# Patient Record
Sex: MALE | Race: WHITE
[De-identification: names, ages, dates, MRNs, and addresses within clinical notes are randomized per-mention and may not be internally consistent; named-entity substitution may affect disease eponyms.]

---

## 2017-02-18 ENCOUNTER — HOSPITAL ENCOUNTER (EMERGENCY)
Dept: HOSPITAL 53 - M ED | Age: 62
LOS: 1 days | Discharge: HOME | End: 2017-02-19
Payer: COMMERCIAL

## 2017-02-18 DIAGNOSIS — Z88.0: ICD-10-CM

## 2017-02-18 DIAGNOSIS — M54.9: ICD-10-CM

## 2017-02-18 DIAGNOSIS — M10.9: ICD-10-CM

## 2017-02-18 DIAGNOSIS — F17.220: ICD-10-CM

## 2017-02-18 DIAGNOSIS — R22.32: ICD-10-CM

## 2017-02-18 DIAGNOSIS — R07.89: Primary | ICD-10-CM

## 2017-02-18 DIAGNOSIS — M19.90: ICD-10-CM

## 2017-02-18 LAB
BASOPHILS # BLD AUTO: 0.1 K/MM3 (ref 0–0.2)
BASOPHILS NFR BLD AUTO: 0.7 % (ref 0–1)
EOSINOPHIL # BLD AUTO: 0.6 K/MM3 (ref 0–0.5)
EOSINOPHIL NFR BLD AUTO: 4.5 % (ref 0–3)
ERYTHROCYTE [DISTWIDTH] IN BLOOD BY AUTOMATED COUNT: 12.8 % (ref 11.5–14.5)
LARGE UNSTAINED CELL #: 0.2 K/MM3 (ref 0–0.4)
LARGE UNSTAINED CELL %: 1.4 % (ref 0–4)
LYMPHOCYTES # BLD AUTO: 1.8 K/MM3 (ref 1.5–4.5)
LYMPHOCYTES NFR BLD AUTO: 13.1 % (ref 24–44)
MCH RBC QN AUTO: 31.1 PG (ref 27–33)
MCHC RBC AUTO-ENTMCNC: 34.8 G/DL (ref 32–36.5)
MCV RBC AUTO: 89.4 FL (ref 80–96)
MONOCYTES # BLD AUTO: 0.9 K/MM3 (ref 0–0.8)
MONOCYTES NFR BLD AUTO: 6.9 % (ref 0–5)
NEUTROPHILS # BLD AUTO: 9.1 K/MM3 (ref 1.8–7.7)
NEUTROPHILS NFR BLD AUTO: 73.3 % (ref 36–66)
PLATELET # BLD AUTO: 304 K/MM3 (ref 150–450)
WBC # BLD AUTO: 12.4 K/MM3 (ref 4–10)

## 2017-02-18 PROCEDURE — 84484 ASSAY OF TROPONIN QUANT: CPT

## 2017-02-18 PROCEDURE — 96374 THER/PROPH/DIAG INJ IV PUSH: CPT

## 2017-02-18 PROCEDURE — 80048 BASIC METABOLIC PNL TOTAL CA: CPT

## 2017-02-18 PROCEDURE — 85025 COMPLETE CBC W/AUTO DIFF WBC: CPT

## 2017-02-18 PROCEDURE — 36415 COLL VENOUS BLD VENIPUNCTURE: CPT

## 2017-02-18 PROCEDURE — 82553 CREATINE MB FRACTION: CPT

## 2017-02-18 PROCEDURE — 99285 EMERGENCY DEPT VISIT HI MDM: CPT

## 2017-02-18 PROCEDURE — 71020: CPT

## 2017-02-18 PROCEDURE — 93005 ELECTROCARDIOGRAM TRACING: CPT

## 2017-02-18 PROCEDURE — 82550 ASSAY OF CK (CPK): CPT

## 2017-02-18 PROCEDURE — 93041 RHYTHM ECG TRACING: CPT

## 2017-02-18 PROCEDURE — 73130 X-RAY EXAM OF HAND: CPT

## 2017-02-19 LAB
ANION GAP SERPL CALC-SCNC: 8 MEQ/L (ref 8–16)
BUN SERPL-MCNC: 12 MG/DL (ref 7–18)
CALCIUM SERPL-MCNC: 8.4 MG/DL (ref 8.8–10.2)
CHLORIDE SERPL-SCNC: 104 MEQ/L (ref 98–107)
CO2 SERPL-SCNC: 27 MEQ/L (ref 21–32)
CREAT SERPL-MCNC: 1.05 MG/DL (ref 0.7–1.3)
GFR SERPL CREATININE-BSD FRML MDRD: > 60 ML/MIN/{1.73_M2} (ref 49–?)
GLUCOSE SERPL-MCNC: 112 MG/DL (ref 80–110)
POTASSIUM SERPL-SCNC: 3.9 MEQ/L (ref 3.5–5.1)
SODIUM SERPL-SCNC: 139 MEQ/L (ref 136–145)

## 2017-02-19 NOTE — EDDOCDS
Physician Documentation                                                                           

[facility]                                                                         

Name: Laci Healy                                                                              

Age: 61 yrs                                                                                       

Sex: Male                                                                                         

: 1955                                                                                   

MRN: [id number]                                                                                     

Arrival Date: 2017                                                                          

Time: 22:32                                                                                       

Account#: [id number]                                                                              

Bed 12                                                                                            

Private MD: NO PRIMARY PHYSICIAN, .                                                               

Disposition:                                                                                      

17 03:17 Discharged to Home/Self Care. Impression: Gout, Chest pain, unspecified.           

- Condition is Stable.                                                                            

- Discharge Instructions: Gout, Gout, Easy-to-Read.                                               

- Prescriptions for Colchicine 0.6 mg Oral Tablet - take 1 tablet by ORAL route as                

directed take 2 tabs at gout onset, then 1 tab q1h prn x 2 doses maximum; 30 tablet.            

indomethacin 50 mg Oral Capsule - take 1 capsule by ORAL route 3 times per day with             

food; 30 capsule.                                                                               

- Medication Reconciliation, Local Pharmacy Hours form.                                           

- Follow up: Randy Steiner MD; When: Call to arrange an appointment; Reason:                     

Continuance of care.                                                                            

- Problem is an acute exacerbation.                                                               

- Symptoms have improved.                                                                         

                                                                                                  

                                                                                                  

                                                                                                  

Historical:                                                                                       

- Allergies: PENICILLINS (vomit blood );                                                          

- Home Meds:                                                                                      

1. pt does not know what he takes                                                               

- PMHx: Arthritis; back pain;                                                                     

- PSHx: none;                                                                                     

- Social history: Smoking status: Chewing Tobacco No barriers to communication noted,             

The patient speaks fluent English, Speaks appropriately for age.                                

- Family history: Not pertinent.                                                                  

- : The pt / caregiver states he / she is not on anticoagulants. Home medication list             

is obtained from the patient.                                                                   

- Exposure Risk Screening:: None identified.                                                      

                                                                                                  

                                                                                                  

Vital Signs:                                                                                      

                                                                                             

22:36  / 88; Pulse 83; Resp 18 S; Temp 96.3(O); Pulse Ox 100% on R/A; Weight 99.79 kg / gr2 

      220 lbs (R); Height 5 ft. 9 in. (175.26 cm) (R); Pain 10/10;                                

22:54  / 94 (auto/);                                                                    mlc 

22:58 Pulse 82 MON; Pulse Ox 96% ;                                                            mlc 

23:36  / 82 (auto/);                                                                    mlc 

23:36 Pulse 74 MON; Pulse Ox 92% ;                                                            mlc 

                                                                                             

00:06 Pulse 72 MON; Pulse Ox 94% ;                                                            mlc 

00:06  / 80 (auto/);                                                                    mlc 

00:36 Pulse 74 MON; Pulse Ox 94% ;                                                            mlc 

00:36  / 78 (auto/);                                                                    mlc 

01:06 Pulse 70 MON; Pulse Ox 94% ;                                                            mlc 

01:06  / 79 (auto/);                                                                    mlc 

01:18 Pulse 70 MON; Pulse Ox 94% ;                                                            mlc 

01:36  / 79 (auto/);                                                                    mlc 

01:36 Pulse 76 MON; Pulse Ox 94% ;                                                            mlc 

02:06  / 76 (auto/);                                                                    mlc 

02:06 Pulse 70 MON; Pulse Ox 94% ;                                                            mlc 

02:36  / 76 (auto/);                                                                    mlc 

02:36 Pulse 76 MON; Pulse Ox 94% ;                                                            mlc 

03:05 Pulse 70 MON; Pulse Ox 94% ;                                                            mlc 

03:06  / 76 (auto/);                                                                    mlc 

03:36  / 81 (auto/);                                                                    mlc 

03:36 Pulse 76 MON; Pulse Ox 96% ;                                                            mlc 

03:41 Pulse 74 MON; Pulse Ox 95% ;                                                            mlc 

03:41  / 74 (auto/);                                                                    mlc 

03:43  / 74; Pulse 73; Resp 18; Temp 98.3(O); Pulse Ox 96% on R/A; Pain 10/10;          amelia 

04:05 Pulse 64 MON; Pulse Ox 95% ;                                                            mlc 

04:25  / 75; Pulse 68; Resp 16; Temp 98.4; Pulse Ox 95% ; Pain 7/10;                    mlc 

                                                                                             

22:36 Body Mass Index 32.49 (99.79 kg, 175.26 cm)                                             gr2 

                                                                                                  

MDM:                                                                                              

                                                                                             

23:17 Cardiac Monitor/Pulse Ox/q 30 min VS ordered.                                           mm11

23:17 IV Saline Lock ordered.                                                                 mm11

23:17 Rhythm Strip to chart ordered.                                                          mm11

23:17 Undress patient appropriately for examination ordered.                                  mm11

23:17 Chest, 2 View (pa\E\lat) Ordered.                                                         EDMS

23:17 Basic Metabolic Profile Ordered.                                                        EDMS

23:17 CBC with Diff Ordered.                                                                  EDMS

23:17 Cardiac Injury Profile Ordered.                                                         EDMS

23:17 Troponin Ordered.                                                                       EDMS

23:18 ECG WITH READING ER PHYS+CARDIAG ordered.                                               EDMS

23:19 Hand, Complete Ordered.                                                                 EDMS

                                                                                             

00:00 Financial registration complete.                                                        pm4 

00:29 Basic Metabolic Profile Reviewed.                                                       mm11

00:29 CBC with Diff Reviewed.                                                                 mm11

00:29 Cardiac Injury Profile Reviewed.                                                        mm11

00:29 Troponin Reviewed.                                                                      mm11

01:24 [facility] Payment Agreement was scanned into [employer] and attached to record.               tamra 

03:18 ketorolac 30 mg IVP once ordered.                                                       mm11

04:05 ketorolac 30 mg IVP once ordered.                                                       mlc 

                                                                                                  

Administered Medications:                                                                         

04:05 Drug: ketorolac 30 mg [ketorolac 30 mg/mL (1 mL) injection solution (1 mL)] Route: IVP; mlc 

      Site: right antecubital;                                                                    

04:24 Follow up: Response: Pain is decreased                                                  [facility] [place] 

                                                                                                  

                                                                                                  

Signatures:                                                                                       

Dispatcher [employer]                           EDMS                                                 

Hemant Veronica,                     DO   mm11                                                 

Buffy BairesRN                       RN   dsTara Burk RN                          RN   Taryn Bonillab                                                  

[place], Reg                     Reg  pm4                                                  

                                                                                                  

The chart was reviewed and I authenticate all verbal orders and agree with the evaluation and 
treatment provided.Attachments:

01:24 [facility] Payment Agreement                                                                rigo 

                                                                                                  

**************************************************************************************************

MTDD

## 2017-02-19 NOTE — REP
Clinical:  Trauma.  Deformity/Swelling.

 

Technique:  AP, lateral, bilateral oblique views left hand.

 

Findings:  The osseous structures and joint spaces are intact and normal.  There

is no evidence for acute fracture or dislocation.  Surrounding soft tissues are

unremarkable.  No subcutaneous emphysema or radiodense foreign body.

 

Impression:

  No acute fracture or dislocation.

 

 

Signed by

Nolan Piedra MD 02/19/2017 08:39 A

## 2017-02-19 NOTE — REP
Clinical:  Chest pain .

 

Comparison: None .

 

Technique:  PA view only .

 

Findings:

The mediastinum and cardiac silhouette are normal.  The lung fields are clear and

without acute consolidation, effusion, or pneumothorax.  The skeletal structures

are intact and normal.

 

Impression:

1.   No acute cardiopulmonary process.

 

 

Signed by

Nolan Piedra MD 02/19/2017 08:23 A

## 2017-02-19 NOTE — EDDOCDS
Nurse's Notes                                                                                     

[facility]                                                                         

Name: Laci Healy                                                                              

Age: 61 yrs                                                                                       

Sex: Male                                                                                         

: 1955                                                                                   

MRN: [id number]                                                                                     

Arrival Date: 2017                                                                          

Time: 22:32                                                                                       

Account#: [id number]                                                                              

Bed 12                                                                                            

Private MD: NO PRIMARY PHYSICIAN, .                                                               

Diagnosis: Gout;Chest pain, unspecified                                                           

                                                                                                  

Presentation:                                                                                     

                                                                                             

22:39 Presenting complaint: Patient states: left wrist started to get swollen this afternoon  dsf 

      and CP started tonight. Adult Sepsis Screening: The patient does not have new or            

      worsening altered mentation. Patient's respiratory rate is less than 22. Systolic blood     

      pressure is greater than 100. Patient has a qSOFA score of 0- Negative Sepsis Screen.       

      Suicide/Homicide risk assessment- the patient denies having any suicidal and/or             

      homicidal ideations and does not present with any other emotional, behavioral or mental     

      health complaints.  Status: Patient is not a  or              

      dependent. Transition of care: patient was not received from another setting of care.       

22:39 Acuity: SHELIA Level 3                                                                     dsf 

22:39 Method Of Arrival: Walkin/Carried/Asstd                                                 dsf 

                                                                                                  

Triage Assessment:                                                                                

22:41 General: Appears in no apparent distress, Behavior is appropriate for age, cooperative. dsf 

      Pain: Location: dorsal aspect of left wrist Pain currently is 10 out of 10 on a pain        

      scale. Quality of pain is described as throbbing. Pt Declines HIV testing.                  

      Cardiovascular: Chest pain is described as Pain is 3 out of 10 on a pain scale. quality     

      is stabbing, is located in substernal area radiates Does not radiate. began 2 hours         

      prior to arrival. Musculoskeletal: Range of motion limited in left wrist Swelling           

      present in dorsal aspect of left wrist.                                                     

                                                                                                  

Historical:                                                                                       

- Allergies: PENICILLINS (vomit blood );                                                          

- Home Meds:                                                                                      

1. pt does not know what he takes                                                               

- PMHx: Arthritis; back pain;                                                                     

- PSHx: none;                                                                                     

- Social history: Smoking status: Chewing Tobacco No barriers to communication noted,             

The patient speaks fluent English, Speaks appropriately for age.                                

- Family history: Not pertinent.                                                                  

- : The pt / caregiver states he / she is not on anticoagulants. Home medication list             

is obtained from the patient.                                                                   

- Exposure Risk Screening:: None identified.                                                      

                                                                                                  

                                                                                                  

Screenin:57 Screening information is obtained from the patient. Fall risk: No risks identified.     mlc 

      Assistance ADL's: requires no assistance with activities of daily living. Abuse/DV          

      Screen: The patient / caregiver reports he/she is: not in a situation that causes fear,     

      pain or injury. Nutritional screening: No deficits noted. Advance Directives:               

      Currently, there is no health care proxy. There is no Power of . home support       

      is adequate.                                                                                

                                                                                                  

Assessment:                                                                                       

22:57 General: Appears in no apparent distress, uncomfortable, unkempt, Behavior is           mlc 

      cooperative. Pain: Location: left antecubital area, dorsal aspect of left forearm and       

      left wrist Pain currently is 10 out of 10 on a pain scale. Neurological: Level of           

      Consciousness is awake, alert, Oriented to person, place, time. Cardiovascular:             

      Capillary refill < 3 seconds in right in left fingers Heart tones S1 S2 present Rhythm      

      is regular Chest pain is denied. Respiratory: Airway is patent Respiratory effort is        

      even, unlabored, Respiratory pattern is regular, Breath sounds are clear bilaterally.       

      Derm: Skin is normal. Musculoskeletal: Capillary refill < 3 seconds Swelling present in     

      left wrist.                                                                                 

23:39 Reassessment: Patient appears in no apparent distress at this time. Patient states      mlc 

      symptoms have not improved.                                                                 

                                                                                             

01:18 Reassessment: Patient appears in no apparent distress at this time. Patient states      mlc 

      symptoms have not improved. pt resting comfortably in bed, resp easy/unlabored. .           

02:20 Reassessment: Patient appears in no apparent distress at this time. pt resting with     mlc 

      eyes closed, resp easy/unlabored. .                                                         

03:30 Reassessment: Patient appears in no apparent distress at this time. no changes since    mlc 

      prior.                                                                                      

04:05 General: Appears in no apparent distress, comfortable, to be sleeping. awakens easily.  mlc 

      resp easy/unlabored. pt medicated per order.                                                

04:25 General: Appears in no apparent distress, comfortable, Behavior is cooperative. Pain:   mlc 

      Pain currently is 7 out of 10 on a pain scale. Neurological: Level of Consciousness is      

      awake, alert, obeys commands, Oriented to person, place, time. Respiratory: Airway is       

      patent Respiratory effort is even, unlabored, Respiratory pattern is regular.               

                                                                                                  

Vital Signs:                                                                                      

                                                                                             

22:36  / 88; Pulse 83; Resp 18 S; Temp 96.3(O); Pulse Ox 100% on R/A; Weight 99.79 kg   gr2 

      (R); Height 5 ft. 9 in. (175.26 cm) (R); Pain 10/10;                                        

22:54  / 94 (auto/);                                                                    mlc 

22:58 Pulse 82 MON; Pulse Ox 96% ;                                                            mlc 

23:36  / 82 (auto/);                                                                    mlc 

23:36 Pulse 74 MON; Pulse Ox 92% ;                                                            mlc 

                                                                                             

00:06 Pulse 72 MON; Pulse Ox 94% ;                                                            mlc 

00:06  / 80 (auto/);                                                                    mlc 

00:36 Pulse 74 MON; Pulse Ox 94% ;                                                            mlc 

00:36  / 78 (auto/);                                                                    mlc 

01:06 Pulse 70 MON; Pulse Ox 94% ;                                                            mlc 

01:06  / 79 (auto/);                                                                    mlc 

01:18 Pulse 70 MON; Pulse Ox 94% ;                                                            mlc 

01:36  / 79 (auto/);                                                                    mlc 

01:36 Pulse 76 MON; Pulse Ox 94% ;                                                            mlc 

02:06  / 76 (auto/);                                                                    mlc 

02:06 Pulse 70 MON; Pulse Ox 94% ;                                                            mlc 

02:36  / 76 (auto/);                                                                    mlc 

02:36 Pulse 76 MON; Pulse Ox 94% ;                                                            mlc 

03:05 Pulse 70 MON; Pulse Ox 94% ;                                                            mlc 

03:06  / 76 (auto/);                                                                    mlc 

03:36  / 81 (auto/);                                                                    mlc 

03:36 Pulse 76 MON; Pulse Ox 96% ;                                                            mlc 

03:41 Pulse 74 MON; Pulse Ox 95% ;                                                            mlc 

03:41  / 74 (auto/);                                                                    mlc 

03:43  / 74; Pulse 73; Resp 18; Temp 98.3(O); Pulse Ox 96% on R/A; Pain 10/10;          amelia 

04:05 Pulse 64 MON; Pulse Ox 95% ;                                                            mlc 

04:25  / 75; Pulse 68; Resp 16; Temp 98.4; Pulse Ox 95% ; Pain 7/10;                    mlc 

                                                                                             

22:36 Body Mass Index 32.49 (99.79 kg, 175.26 cm)                                             gr2 

                                                                                                  

Vitals:                                                                                           

                                                                                             

22:36 Log In Time: 2017 at 22:36.                                                gr2 

                                                                                                  

ED Course:                                                                                        

22:36 Patient visited by Peña Monroe.                                                   gr2 

22:36 NO PRIMARY PHYSICIAN, . is Private Physician.                                           gr2 

22:36 Patient moved to Waiting                                                                gr2 

22:37 Patient visited by Peña Monroe.                                                   gr2 

22:37 Patient moved to Pre RCE                                                                gr2 

22:40 Triage Initiated                                                                        dsf 

22:46 Tara Schwab RN is Primary Nurse.                                                        dsf 

22:46 Patient moved to 12                                                                     dsf 

22:53 Hemant Veronica DO is Attending Physician.                                            mm11

22:53 Patient visited by Hemant Veronica DO.                                                mm11

22:58 Patient visited by Tara Schwab RN.                                                      mlc 

23:16 Patient visited by Hemant Veronica DO.                                                mm11

23:35 Patient visited by Katie Gómez PCA.                                                 amelia 

23:35 Pt greeted and oriented to ED. Patient advised of names of staff involved in care,      amelia 

      location of call bell, wait times and NPO status. Patient has correct armband on for        

      positive identification. Placed in gown. Bed in low position. Call light in reach. Side     

      rails up X2. Cardiac monitor on. Pulse ox on. NIBP on.                                      

23:35 EKG done. (by ED staff). Reviewed by Hemant Veronica DO.                                amelia 

23:39 Basic Metabolic Profile Sent.                                                           mlc 

23:39 CBC with Diff Sent.                                                                     mlc 

23:39 Cardiac Injury Profile Sent.                                                            mlc 

23:39 Troponin Sent.                                                                          mlc 

23:40 Patient visited by Tara Schwab RN.                                                      mlc 

23:40 The patient / caregiver is instructed regarding the plan of care and ED course.         mlc 

23:40 Inserted saline lock: 20 gauge in right antecubital area and blood collected. The       [facility] 

      patient tolerated the procedure well.                                                       

                                                                                             

00:58 Patient visited by Katie Gómez PCA.                                                 amelia 

01:20 Patient visited by Tara Schwab RN.                                                      mlc 

01:24 [facility] Payment Agreement was scanned into [employer] and attached to record.               gjb 

02:52 Patient visited by Hemant Veronica DO.                                                mm11

03:16 Randy Steiner MD is Referral Physician.                                                mm11

03:43 Patient visited by Katie Gómez PCA.                                                 amelia 

04:06 Patient visited by Tara Schwab RN.                                                      mlc 

04:25 Discontinued IV lock intact, bleeding controlled, pressure dressing applied, No         mlc 

      redness/swelling at site. No procedures done that require assistance.                       

                                                                                                  

Administered Medications:                                                                         

04:05 Drug: ketorolac 30 mg [ketorolac 30 mg/mL (1 mL) injection solution (1 mL)] Route: IVP; mlc 

      Site: right antecubital;                                                                    

04:24 Follow up: Response: Pain is decreased                                                  mlc 

                                                                                                  

                                                                                                  

Order Results:                                                                                    

Lab Order: Basic Metabolic Profile; SPEC17 23:37                                         

      Test: GLUCOSE, FASTING; Value: 112; Range: ; Abnormal: Above high normal; Units:      

      MG/DL; Status: F                                                                            

      Test: BLOOD UREA NITROGEN; Value: 12; Range: 7-18; Units: MG/DL; Status: F                  

      Test: CREATININE FOR GFR; Value: 1.05; Range: 0.70-1.30; Units: MG/DL; Status: F            

      Test: GLOMERULAR FILTRATION RATE; Value: > 60.0; Range: >49; Status: F                      

      Test: SODIUM LEVEL; Value: 139; Range: 136-145; Units: MEQ/L; Status: F                     

      Test: POTASSIUM SERUM; Value: 3.9; Range: 3.5-5.1; Units: MEQ/L; Status: F                  

      Test: CHLORIDE LEVEL; Value: 104; Range: ; Units: MEQ/L; Status: F                    

      Test: CARBON DIOXIDE LEVEL; Value: 27; Range: 21-32; Units: MEQ/L; Status: F                

      Test: ANION GAP; Value: 8; Range: 8-16; Units: MEQ/L; Status: F                             

      Test: CALCIUM LEVEL; Value: 8.4; Range: 8.8-10.2; Abnormal: Below low normal; Units:        

      MG/DL; Status: F                                                                            

      Test Note: &nbsp;; Units are mL/min/1.73 m2 Chronic Kidney Disease Staging per NKF:       

      Stage I & II GFR >=60 Normal to Mildly Decreased Stage III GFR 30-59 Moderately           

      Decreased Stage IV GFR 15-29 Severely Decreased Stage V GFR <15 Very Little GFR Left        

      ESRD GFR <15 on RRT                                                                         

Lab Order: CBC with Diff; SPEC17 23:37                                                   

      Test: WHITE BLOOD COUNT; Value: 12.4; Range: 4.0-10.0; Abnormal: Above high normal;         

      Units: K/mm3; Status: F                                                                     

      Test: RED BLOOD COUNT; Value: 5.02; Range: 4.30-6.10; Units: M/mm3; Status: F               

      Test: HEMOGLOBIN; Value: 15.6; Range: 14.0-18.0; Units: g/dl; Status: F                     

      Test: HEMATOCRIT; Value: 44.8; Range: 42.0-52.0; Units: %; Status: F                        

      Test: MEAN CORPUSCULAR VOLUME; Value: 89.4; Range: 80.0-96.0; Units: fl; Status: F          

      Test: MEAN CORPUSCULAR HEMOGLOBIN; Value: 31.1; Range: 27.0-33.0; Units: pg; Status: F      

      Test: MEAN CORPUSCULAR HGB CONC; Value: 34.8; Range: 32.0-36.5; Units: g/dl; Status: F      

      Test: RED CELL DISTRIBUTION WIDTH; Value: 12.8; Range: 11.5-14.5; Units: %; Status: F       

      Test: PLATELET COUNT, AUTOMATED; Value: 304; Range: 150-450; Units: k/mm3; Status: F        

      Test: NEUTROPHILS %; Value: 73.3; Range: 36.0-66.0; Abnormal: Above high normal; Units:     

      %; Status: F                                                                                

      Test: LYMPH %; Value: 13.1; Range: 24.0-44.0; Abnormal: Below low normal; Units: %;         

      Status: F                                                                                   

      Test: MONO %; Value: 6.9; Range: 0.0-5.0; Abnormal: Above high normal; Units: %;            

      Status: F                                                                                   

      Test: EOS %; Value: 4.5; Range: 0.0-3.0; Abnormal: Above high normal; Units: %; Status:     

      F                                                                                           

      Test: BASO %; Value: 0.7; Range: 0.0-1.0; Units: %; Status: F                               

      Test: LARGE UNSTAINED CELL %; Value: 1.4; Range: 0.0-4.0; Units: %; Status: F               

      Test: NEUTROPHILS #; Value: 9.1; Range: 1.8-7.7; Abnormal: Above high normal; Units:        

      K/mm3; Status: F                                                                            

      Test: LYMPH #; Value: 1.8; Range: 1.5-4.5; Units: K/mm3; Status: F                          

      Test: MONO #; Value: 0.9; Range: 0.0-0.8; Abnormal: Above high normal; Units: K/mm3;        

      Status: F                                                                                   

      Test: EOS #; Value: 0.6; Range: 0.0-0.50; Abnormal: Above high normal; Units: K/mm3;        

      Status: F                                                                                   

      Test: BASO #; Value: 0.1; Range: 0.0-0.2; Units: K/mm3; Status: F                           

      Test: LARGE UNSTAINED CELL #; Value: 0.2; Range: 0.0-0.4; Units: K/mm3; Status: F           

Lab Order: Cardiac Injury Profile; SPEC'M 17 23:37                                          

      Test: CPK CREATINE PHOSPHOKINASE; Value: 51; Range: ; Units: U/L; Status: F           

      Test: CK-MB VALUE MASS; Value: 1.8; Range: 0.0-3.6; Units: NG/ML; Status: F                 

      Test: MB/CK RELATIVE INDEX; Value: 3.52; Range: < OR =4; Status: F                          

      Test Note: &nbsp;; DIAGNOSIS CRITERIA MMB ng/ml Relative Index (RI) NON-AMI < or = 5      

      N/A GRAY ZONE > 5 < or = 4 AMI > 5 > 4                                                      

Lab Order: Troponin; SPEC'M 17 23:37                                                        

      Test: TROPONIN I; Value: < 0.02; Range: < 0.10; Units: NG/ML; Status: F                     

      Test Note: &nbsp;; Troponin I Reference Interval for Siemens Assonet LOCI: 99th             

      Percentile= 0.00-0.045 ng/ml Risk Stratification: <= 0.10 ng/ml Decreased Risk for          

      Adverse Clinical Events. 0.10-1.50 ng/ml Increased Risk for Adverse Clinical Events.        

      Evaluation of additional criterion and/or repeat testing in 2-6 hours is suggested to       

      rule out myocardial damage. >= 1.50 ng/ml Indicative of Myocardial Injury.                  

                                                                                                  

Outcome:                                                                                          

03:17 Discharge ordered by Provider.                                                          mm11

04:25 Discharge Assessment: Patient awake, alert and oriented x 3. No cognitive and/or        mlc 

      functional deficits noted. Patient verbalized understanding of disposition                  

      instructions. patient administered narcotics - no. The following High Risk Discharge        

      criteria are identified: None. Discharged to home ambulatory. Condition: good               

      Condition: stable. Discharge instructions given to patient, Instructed on discharge         

      instructions, follow up and referral plans. medication usage, Demonstrated                  

      understanding of instructions, medications, Pt was receptive of discharge instructions/     

      teaching. Prescriptions given X 2. No special radiology studies were completed.             

      Property sent home with patient.                                                            

04:27 Patient left the ED.                                                                    [facility] 

                                                                                                  

Signatures:                                                                                       

Hemant Veronica DO DO   mm11                                                 

Katie Gómez, PCA                     PCA  Buffy Ponce,RN                       RN   Peña Moss                            gr2                                                  

Tara Schwab RN                          RN   Taryn Bonilla                                                  

                                                                                                  

**************************************************************************************************

MTDD

## 2017-02-19 NOTE — ECGEPIP
Stationary ECG Study

                           [facility] - ED

                                       

                                       Test Date:    2017

Pat Name:     SANDRA ALTAMIRANO           Department:   

Patient ID:   [id number]                 Room:         -

Gender:       M                        Technician:   blaine GOTTIB:          1955               Requested By: SHERRY HOOKER

Order Number: [id number]     Reading MD:   Yefri Melendez

                                 Measurements

Intervals                              Axis          

Rate:         71                       P:            33

WY:           136                      QRS:          -17

QRSD:         94                       T:            39

QT:           398                                    

QTc:          435                                    

                           Interpretive Statements

SINUS RHYTHM

NO PRIORS

Electronically Signed On 2017 8:31:04 EST by Yefri Melendez

## 2017-02-21 NOTE — EDDOCDS
Physician Documentation                                                                           

[facility]                                                                         

Name: Laci Healy                                                                              

Age: 61 yrs                                                                                       

Sex: Male                                                                                         

: 1955                                                                                   

MRN: [id number]                                                                                     

Arrival Date: 2017                                                                          

Time: 22:32                                                                                       

Account#: [id number]                                                                              

Bed 12                                                                                            

Private MD: NO PRIMARY PHYSICIAN, .                                                               

Disposition:                                                                                      

17 03:17 Discharged to Home/Self Care. Impression: Gout, Chest pain, unspecified.           

- Condition is Stable.                                                                            

- Discharge Instructions: Gout, Gout, Easy-to-Read.                                               

- Prescriptions for Colchicine 0.6 mg Oral Tablet - take 1 tablet by ORAL route as                

directed take 2 tabs at gout onset, then 1 tab q1h prn x 2 doses maximum; 30 tablet.            

indomethacin 50 mg Oral Capsule - take 1 capsule by ORAL route 3 times per day with             

food; 30 capsule.                                                                               

- Medication Reconciliation, Local Pharmacy Hours form.                                           

- Follow up: Randy Steiner MD; When: Call to arrange an appointment; Reason:                     

Continuance of care.                                                                            

- Problem is an acute exacerbation.                                                               

- Symptoms have improved.                                                                         

                                                                                                  

                                                                                                  

                                                                                                  

Historical:                                                                                       

- Allergies: PENICILLINS (vomit blood );                                                          

- Home Meds:                                                                                      

1. pt does not know what he takes                                                               

- PMHx: Arthritis; back pain;                                                                     

- PSHx: none;                                                                                     

- Social history: Smoking status: Chewing Tobacco No barriers to communication noted,             

The patient speaks fluent English, Speaks appropriately for age.                                

- Family history: Not pertinent.                                                                  

- : The pt / caregiver states he / she is not on anticoagulants. Home medication list             

is obtained from the patient.                                                                   

- Exposure Risk Screening:: None identified.                                                      

                                                                                                  

                                                                                                  

Vital Signs:                                                                                      

                                                                                             

22:36  / 88; Pulse 83; Resp 18 S; Temp 96.3(O); Pulse Ox 100% on R/A; Weight 99.79 kg / gr2 

      220 lbs (R); Height 5 ft. 9 in. (175.26 cm) (R); Pain 10/10;                                

22:54  / 94 (auto/);                                                                    mlc 

22:58 Pulse 82 MON; Pulse Ox 96% ;                                                            mlc 

23:36  / 82 (auto/);                                                                    mlc 

23:36 Pulse 74 MON; Pulse Ox 92% ;                                                            mlc 

                                                                                             

00:06 Pulse 72 MON; Pulse Ox 94% ;                                                            mlc 

00:06  / 80 (auto/);                                                                    mlc 

00:36 Pulse 74 MON; Pulse Ox 94% ;                                                            mlc 

00:36  / 78 (auto/);                                                                    mlc 

01:06 Pulse 70 MON; Pulse Ox 94% ;                                                            mlc 

01:06  / 79 (auto/);                                                                    mlc 

01:18 Pulse 70 MON; Pulse Ox 94% ;                                                            mlc 

01:36  / 79 (auto/);                                                                    mlc 

01:36 Pulse 76 MON; Pulse Ox 94% ;                                                            mlc 

02:06  / 76 (auto/);                                                                    mlc 

02:06 Pulse 70 MON; Pulse Ox 94% ;                                                            mlc 

02:36  / 76 (auto/);                                                                    mlc 

02:36 Pulse 76 MON; Pulse Ox 94% ;                                                            mlc 

03:05 Pulse 70 MON; Pulse Ox 94% ;                                                            mlc 

03:06  / 76 (auto/);                                                                    mlc 

03:36  / 81 (auto/);                                                                    mlc 

03:36 Pulse 76 MON; Pulse Ox 96% ;                                                            mlc 

03:41 Pulse 74 MON; Pulse Ox 95% ;                                                            mlc 

03:41  / 74 (auto/);                                                                    mlc 

03:43  / 74; Pulse 73; Resp 18; Temp 98.3(O); Pulse Ox 96% on R/A; Pain 10/10;          amelia 

04:05 Pulse 64 MON; Pulse Ox 95% ;                                                            mlc 

04:25  / 75; Pulse 68; Resp 16; Temp 98.4; Pulse Ox 95% ; Pain 7/10;                    mlc 

                                                                                             

22:36 Body Mass Index 32.49 (99.79 kg, 175.26 cm)                                             gr2 

                                                                                                  

MDM:                                                                                              

                                                                                             

23:17 Cardiac Monitor/Pulse Ox/q 30 min VS ordered.                                           mm11

23:17 IV Saline Lock ordered.                                                                 mm11

23:17 Rhythm Strip to chart ordered.                                                          mm11

23:17 Undress patient appropriately for examination ordered.                                  mm11

23:17 Chest, 2 View (pa\E\lat) Ordered.                                                         EDMS

23:17 Basic Metabolic Profile Ordered.                                                        EDMS

23:17 CBC with Diff Ordered.                                                                  EDMS

23:17 Cardiac Injury Profile Ordered.                                                         EDMS

23:17 Troponin Ordered.                                                                       EDMS

23:18 ECG WITH READING ER PHYS+CARDIAG ordered.                                               EDMS

23:19 Hand, Complete Ordered.                                                                 EDMS

                                                                                             

00:00 Financial registration complete.                                                        pm4 

00:29 Basic Metabolic Profile Reviewed.                                                       mm11

00:29 CBC with Diff Reviewed.                                                                 mm11

00:29 Cardiac Injury Profile Reviewed.                                                        mm11

00:29 Troponin Reviewed.                                                                      mm11

01:24 [facility] Payment Agreement was scanned into [employer] and attached to record.               gjb 

03:18 ketorolac 30 mg IVP once ordered.                                                       mm11

04:05 ketorolac 30 mg IVP once ordered.                                                       [facility] 

08:52 T-Sheet-- Draft Copy was scanned into [employer] and attached to record.                   jp5 

                                                                                             

11:06 ECG/EKG was scanned into [employer] and attached to record.                                gb  

                                                                                                  

Administered Medications:                                                                         

                                                                                             

04:05 Drug: ketorolac 30 mg [ketorolac 30 mg/mL (1 mL) injection solution (1 mL)] Route: IVP; mlc 

      Site: right antecubital;                                                                    

04:24 Follow up: Response: Pain is decreased                                                  mlc 

                                                                                                  

                                                                                                  

Signatures:                                                                                       

Dispatcher [employer]                           EDMS                                                 

Rachana Pepper, Reg                  Reg  gb                                                   

Hemant Veronica,                     DO   mm11                                                 

Buffy Baires RN                       RN   dsTara Burk RN                          RN   [facility]                                                  

Rolf Bradley                              5                                                  

Taryn Becerra                               gjb                                                  

[place], Reg                     Reg  pm4                                                  

                                                                                                  

The chart was reviewed and I authenticate all verbal orders and agree with the evaluation and 
treatment provided.Attachments:

01:24 [facility] Payment Agreement                                                                gjb 

08:52 T-Sheet-- Draft Copy                                                                    5 

                                                                                             

11:06 ECG/EKG                                                                                 gb  

                                                                                                  

**************************************************************************************************



*** Chart Complete ***
MTDD

## 2017-02-21 NOTE — EDDOCDS
Nurse's Notes                                                                                     

[facility]                                                                         

Name: Sandra Altamirano                                                                              

Age: 61 yrs                                                                                       

Sex: Male                                                                                         

: 1955                                                                                   

MRN: [id number]                                                                                     

Arrival Date: 2017                                                                          

Time: 22:32                                                                                       

Account#: [id number]                                                                              

Bed 12                                                                                            

Private MD: NO PRIMARY PHYSICIAN, .                                                               

Diagnosis: Gout;Chest pain, unspecified                                                           

                                                                                                  

Presentation:                                                                                     

                                                                                             

22:39 Presenting complaint: Patient states: left wrist started to get swollen this afternoon  dsf 

      and CP started tonight. Adult Sepsis Screening: The patient does not have new or            

      worsening altered mentation. Patient's respiratory rate is less than 22. Systolic blood     

      pressure is greater than 100. Patient has a qSOFA score of 0- Negative Sepsis Screen.       

      Suicide/Homicide risk assessment- the patient denies having any suicidal and/or             

      homicidal ideations and does not present with any other emotional, behavioral or mental     

      health complaints.  Status: Patient is not a  or              

      dependent. Transition of care: patient was not received from another setting of care.       

22:39 Acuity: SHELIA Level 3                                                                     dsf 

22:39 Method Of Arrival: Walkin/Carried/Asstd                                                 dsf 

                                                                                                  

Triage Assessment:                                                                                

22:41 General: Appears in no apparent distress, Behavior is appropriate for age, cooperative. dsf 

      Pain: Location: dorsal aspect of left wrist Pain currently is 10 out of 10 on a pain        

      scale. Quality of pain is described as throbbing. Pt Declines HIV testing.                  

      Cardiovascular: Chest pain is described as Pain is 3 out of 10 on a pain scale. quality     

      is stabbing, is located in substernal area radiates Does not radiate. began 2 hours         

      prior to arrival. Musculoskeletal: Range of motion limited in left wrist Swelling           

      present in dorsal aspect of left wrist.                                                     

                                                                                                  

Historical:                                                                                       

- Allergies: PENICILLINS (vomit blood );                                                          

- Home Meds:                                                                                      

1. pt does not know what he takes                                                               

- PMHx: Arthritis; back pain;                                                                     

- PSHx: none;                                                                                     

- Social history: Smoking status: Chewing Tobacco No barriers to communication noted,             

The patient speaks fluent English, Speaks appropriately for age.                                

- Family history: Not pertinent.                                                                  

- : The pt / caregiver states he / she is not on anticoagulants. Home medication list             

is obtained from the patient.                                                                   

- Exposure Risk Screening:: None identified.                                                      

                                                                                                  

                                                                                                  

Screenin:57 Screening information is obtained from the patient. Fall risk: No risks identified.     mlc 

      Assistance ADL's: requires no assistance with activities of daily living. Abuse/DV          

      Screen: The patient / caregiver reports he/she is: not in a situation that causes fear,     

      pain or injury. Nutritional screening: No deficits noted. Advance Directives:               

      Currently, there is no health care proxy. There is no Power of . home support       

      is adequate.                                                                                

                                                                                                  

Assessment:                                                                                       

22:57 General: Appears in no apparent distress, uncomfortable, unkempt, Behavior is           mlc 

      cooperative. Pain: Location: left antecubital area, dorsal aspect of left forearm and       

      left wrist Pain currently is 10 out of 10 on a pain scale. Neurological: Level of           

      Consciousness is awake, alert, Oriented to person, place, time. Cardiovascular:             

      Capillary refill < 3 seconds in right in left fingers Heart tones S1 S2 present Rhythm      

      is regular Chest pain is denied. Respiratory: Airway is patent Respiratory effort is        

      even, unlabored, Respiratory pattern is regular, Breath sounds are clear bilaterally.       

      Derm: Skin is normal. Musculoskeletal: Capillary refill < 3 seconds Swelling present in     

      left wrist.                                                                                 

23:39 Reassessment: Patient appears in no apparent distress at this time. Patient states      mlc 

      symptoms have not improved.                                                                 

                                                                                             

01:18 Reassessment: Patient appears in no apparent distress at this time. Patient states      mlc 

      symptoms have not improved. pt resting comfortably in bed, resp easy/unlabored. .           

02:20 Reassessment: Patient appears in no apparent distress at this time. pt resting with     mlc 

      eyes closed, resp easy/unlabored. .                                                         

03:30 Reassessment: Patient appears in no apparent distress at this time. no changes since    mlc 

      prior.                                                                                      

04:05 General: Appears in no apparent distress, comfortable, to be sleeping. awakens easily.  mlc 

      resp easy/unlabored. pt medicated per order.                                                

04:25 General: Appears in no apparent distress, comfortable, Behavior is cooperative. Pain:   mlc 

      Pain currently is 7 out of 10 on a pain scale. Neurological: Level of Consciousness is      

      awake, alert, obeys commands, Oriented to person, place, time. Respiratory: Airway is       

      patent Respiratory effort is even, unlabored, Respiratory pattern is regular.               

                                                                                                  

Vital Signs:                                                                                      

                                                                                             

22:36  / 88; Pulse 83; Resp 18 S; Temp 96.3(O); Pulse Ox 100% on R/A; Weight 99.79 kg   gr2 

      (R); Height 5 ft. 9 in. (175.26 cm) (R); Pain 10/10;                                        

22:54  / 94 (auto/);                                                                    mlc 

22:58 Pulse 82 MON; Pulse Ox 96% ;                                                            mlc 

23:36  / 82 (auto/);                                                                    mlc 

23:36 Pulse 74 MON; Pulse Ox 92% ;                                                            mlc 

                                                                                             

00:06 Pulse 72 MON; Pulse Ox 94% ;                                                            mlc 

00:06  / 80 (auto/);                                                                    mlc 

00:36 Pulse 74 MON; Pulse Ox 94% ;                                                            mlc 

00:36  / 78 (auto/);                                                                    mlc 

01:06 Pulse 70 MON; Pulse Ox 94% ;                                                            mlc 

01:06  / 79 (auto/);                                                                    mlc 

01:18 Pulse 70 MON; Pulse Ox 94% ;                                                            mlc 

01:36  / 79 (auto/);                                                                    mlc 

01:36 Pulse 76 MON; Pulse Ox 94% ;                                                            mlc 

02:06  / 76 (auto/);                                                                    mlc 

02:06 Pulse 70 MON; Pulse Ox 94% ;                                                            mlc 

02:36  / 76 (auto/);                                                                    mlc 

02:36 Pulse 76 MON; Pulse Ox 94% ;                                                            mlc 

03:05 Pulse 70 MON; Pulse Ox 94% ;                                                            mlc 

03:06  / 76 (auto/);                                                                    mlc 

03:36  / 81 (auto/);                                                                    mlc 

03:36 Pulse 76 MON; Pulse Ox 96% ;                                                            mlc 

03:41 Pulse 74 MON; Pulse Ox 95% ;                                                            mlc 

03:41  / 74 (auto/);                                                                    mlc 

03:43  / 74; Pulse 73; Resp 18; Temp 98.3(O); Pulse Ox 96% on R/A; Pain 10/10;          amelia 

04:05 Pulse 64 MON; Pulse Ox 95% ;                                                            mlc 

04:25  / 75; Pulse 68; Resp 16; Temp 98.4; Pulse Ox 95% ; Pain 7/10;                    mlc 

                                                                                             

22:36 Body Mass Index 32.49 (99.79 kg, 175.26 cm)                                             gr2 

                                                                                                  

Vitals:                                                                                           

                                                                                             

22:36 Log In Time: 2017 at 22:36.                                                gr2 

                                                                                                  

ED Course:                                                                                        

22:36 Patient visited by Peña Monroe.                                                   gr2 

22:36 NO PRIMARY PHYSICIAN, . is Private Physician.                                           gr2 

22:36 Patient moved to Waiting                                                                gr2 

22:37 Patient visited by Peña Monroe.                                                   gr2 

22:37 Patient moved to Pre RCE                                                                gr2 

22:40 Triage Initiated                                                                        dsf 

22:46 Tara SchwabRN is Primary Nurse.                                                        dsf 

22:46 Patient moved to 12                                                                     dsf 

22:53 Sherry Veronica DO is Attending Physician.                                            mm11

22:53 Patient visited by Sherry Veronica DO.                                                mm11

22:58 Patient visited by Tara Schwab RN.                                                      mlc 

23:16 Patient visited by Sherry Veronica DO.                                                mm11

23:35 Patient visited by Katie Gómez PCA.                                                 amelia 

23:35 Pt greeted and oriented to ED. Patient advised of names of staff involved in care,      amelia 

      location of call bell, wait times and NPO status. Patient has correct armband on for        

      positive identification. Placed in gown. Bed in low position. Call light in reach. Side     

      rails up X2. Cardiac monitor on. Pulse ox on. NIBP on.                                      

23:35 EKG done. (by ED staff). Reviewed by Sherry Veronica DO.                                amelia 

23:39 Basic Metabolic Profile Sent.                                                           mlc 

23:39 CBC with Diff Sent.                                                                     mlc 

23:39 Cardiac Injury Profile Sent.                                                            mlc 

23:39 Troponin Sent.                                                                          mlc 

23:40 Patient visited by Tara Schwab RN.                                                      mlc 

23:40 The patient / caregiver is instructed regarding the plan of care and ED course.         mlc 

23:40 Inserted saline lock: 20 gauge in right antecubital area and blood collected. The       mlc 

      patient tolerated the procedure well.                                                       

                                                                                             

00:58 Patient visited by Katie Gómez PCA.                                                 amelia 

01:20 Patient visited by Tara Schwab RN.                                                      mlc 

01:24 [facility] Payment Agreement was scanned into [employer] and attached to record.               gjb 

02:52 Patient visited by Sherry Veronica DO.                                                mm11

03:16 Randy Steiner MD is Referral Physician.                                                mm11

03:43 Patient visited by Katie Gómez PCA.                                                 amelia 

04:06 Patient visited by Tara Schwab RN.                                                      mlc 

04:25 Discontinued IV lock intact, bleeding controlled, pressure dressing applied, No         mlc 

      redness/swelling at site. No procedures done that require assistance.                       

08:38 EKG-ADULT Returned.                                                                     EDMS

08:39 Chest, 2 View (pa\E\lat) Returned.                                                        EDMS

08:52 T-Sheet-- Draft Copy was scanned into [employer] and attached to record.                   jp5 

09:13 Hand, Complete Returned.                                                                EDMS

                                                                                             

11:06 ECG/EKG was scanned into [employer] and attached to record.                                gb  

                                                                                                  

Administered Medications:                                                                         

                                                                                             

04:05 Drug: ketorolac 30 mg [ketorolac 30 mg/mL (1 mL) injection solution (1 mL)] Route: IVP; mlc 

      Site: right antecubital;                                                                    

04:24 Follow up: Response: Pain is decreased                                                  mlc 

                                                                                                  

                                                                                                  

Order Results:                                                                                    

Lab Order: Basic Metabolic Profile; SPEC'M 17 23:37                                         

      Test: GLUCOSE, FASTING; Value: 112; Range: ; Abnormal: Above high normal; Units:      

      MG/DL; Status: F                                                                            

      Test: BLOOD UREA NITROGEN; Value: 12; Range: 7-18; Units: MG/DL; Status: F                  

      Test: CREATININE FOR GFR; Value: 1.05; Range: 0.70-1.30; Units: MG/DL; Status: F            

      Test: GLOMERULAR FILTRATION RATE; Value: > 60.0; Range: >49; Status: F                      

      Test: SODIUM LEVEL; Value: 139; Range: 136-145; Units: MEQ/L; Status: F                     

      Test: POTASSIUM SERUM; Value: 3.9; Range: 3.5-5.1; Units: MEQ/L; Status: F                  

      Test: CHLORIDE LEVEL; Value: 104; Range: ; Units: MEQ/L; Status: F                    

      Test: CARBON DIOXIDE LEVEL; Value: 27; Range: 21-32; Units: MEQ/L; Status: F                

      Test: ANION GAP; Value: 8; Range: 8-16; Units: MEQ/L; Status: F                             

      Test: CALCIUM LEVEL; Value: 8.4; Range: 8.8-10.2; Abnormal: Below low normal; Units:        

      MG/DL; Status: F                                                                            

      Test Note: &nbsp;; Units are mL/min/1.73 m2 Chronic Kidney Disease Staging per NKF:       

      Stage I & II GFR >=60 Normal to Mildly Decreased Stage III GFR 30-59 Moderately           

      Decreased Stage IV GFR 15-29 Severely Decreased Stage V GFR <15 Very Little GFR Left        

      ESRD GFR <15 on RRT                                                                         

Lab Order: CBC with Diff; SPEC'M 17 23:37                                                   

      Test: WHITE BLOOD COUNT; Value: 12.4; Range: 4.0-10.0; Abnormal: Above high normal;         

      Units: K/mm3; Status: F                                                                     

      Test: RED BLOOD COUNT; Value: 5.02; Range: 4.30-6.10; Units: M/mm3; Status: F               

      Test: HEMOGLOBIN; Value: 15.6; Range: 14.0-18.0; Units: g/dl; Status: F                     

      Test: HEMATOCRIT; Value: 44.8; Range: 42.0-52.0; Units: %; Status: F                        

      Test: MEAN CORPUSCULAR VOLUME; Value: 89.4; Range: 80.0-96.0; Units: fl; Status: F          

      Test: MEAN CORPUSCULAR HEMOGLOBIN; Value: 31.1; Range: 27.0-33.0; Units: pg; Status: F      

      Test: MEAN CORPUSCULAR HGB CONC; Value: 34.8; Range: 32.0-36.5; Units: g/dl; Status: F      

      Test: RED CELL DISTRIBUTION WIDTH; Value: 12.8; Range: 11.5-14.5; Units: %; Status: F       

      Test: PLATELET COUNT, AUTOMATED; Value: 304; Range: 150-450; Units: k/mm3; Status: F        

      Test: NEUTROPHILS %; Value: 73.3; Range: 36.0-66.0; Abnormal: Above high normal; Units:     

      %; Status: F                                                                                

      Test: LYMPH %; Value: 13.1; Range: 24.0-44.0; Abnormal: Below low normal; Units: %;         

      Status: F                                                                                   

      Test: MONO %; Value: 6.9; Range: 0.0-5.0; Abnormal: Above high normal; Units: %;            

      Status: F                                                                                   

      Test: EOS %; Value: 4.5; Range: 0.0-3.0; Abnormal: Above high normal; Units: %; Status:     

      F                                                                                           

      Test: BASO %; Value: 0.7; Range: 0.0-1.0; Units: %; Status: F                               

      Test: LARGE UNSTAINED CELL %; Value: 1.4; Range: 0.0-4.0; Units: %; Status: F               

      Test: NEUTROPHILS #; Value: 9.1; Range: 1.8-7.7; Abnormal: Above high normal; Units:        

      K/mm3; Status: F                                                                            

      Test: LYMPH #; Value: 1.8; Range: 1.5-4.5; Units: K/mm3; Status: F                          

      Test: MONO #; Value: 0.9; Range: 0.0-0.8; Abnormal: Above high normal; Units: K/mm3;        

      Status: F                                                                                   

      Test: EOS #; Value: 0.6; Range: 0.0-0.50; Abnormal: Above high normal; Units: K/mm3;        

      Status: F                                                                                   

      Test: BASO #; Value: 0.1; Range: 0.0-0.2; Units: K/mm3; Status: F                           

      Test: LARGE UNSTAINED CELL #; Value: 0.2; Range: 0.0-0.4; Units: K/mm3; Status: F           

Lab Order: Cardiac Injury Profile; SPEC' 17 23:37                                          

      Test: CPK CREATINE PHOSPHOKINASE; Value: 51; Range: ; Units: U/L; Status: F           

      Test: CK-MB VALUE MASS; Value: 1.8; Range: 0.0-3.6; Units: NG/ML; Status: F                 

      Test: MB/CK RELATIVE INDEX; Value: 3.52; Range: < OR =4; Status: F                          

      Test Note: &nbsp;; DIAGNOSIS CRITERIA MMB ng/ml Relative Index (RI) NON-AMI < or = 5      

      N/A GRAY ZONE > 5 < or = 4 AMI > 5 > 4                                                      

Lab Order: Troponin; SPEC' 17 23:37                                                        

      Test: TROPONIN I; Value: < 0.02; Range: < 0.10; Units: NG/ML; Status: F                     

      Test Note: &nbsp;; Troponin I Reference Interval for [employer] [place] LOCI: 99th             

      Percentile= 0.00-0.045 ng/ml Risk Stratification: <= 0.10 ng/ml Decreased Risk for          

      Adverse Clinical Events. 0.10-1.50 ng/ml Increased Risk for Adverse Clinical Events.        

      Evaluation of additional criterion and/or repeat testing in 2-6 hours is suggested to       

      rule out myocardial damage. >= 1.50 ng/ml Indicative of Myocardial Injury.                  

                                                                                                  

Radiology Order: Chest, 2 View (pa\E\lat)                                                         

      Test: Chest, 2 View (pa\E\lat)                                                              

      REASON FOR EXAMINATION: Chest Pain; Clinical: Chest pain .; ; Comparison: None .; ;         

      Technique: PA view only .; ; Findings:; The mediastinum and cardiac silhouette are          

      normal. The lung fields are clear and; without acute consolidation, effusion, or            

      pneumothorax. The skeletal structures; are intact and normal.; ; Impression:; 1. No         

      acute cardiopulmonary process.; ; ; Signed by; Nolan Piedra MD 2017 08:23 A;        

Radiology Order: EKG-ADULT                                                                        

      Test: EKG-ADULT                                                                             

      REASON FOR EXAMINATION: Chest Pain; Stationary ECG Study; [facility] - ED; ;        

      Test Date: 2017; Pat Name: SANDRA ALTAMIRANO Department:; Patient ID: [id number] Room:     

      -; Gender: M Technician: de; : 1955 Requested By: SHERRY HOOKER; Order         

      Number: [id number] Reading MD: Yefri Melendez; Measurements; Intervals Axis;        

      Rate: 71 P: 33; WV: 136 QRS: -17; QRSD: 94 T: 39; QT: 398; QTc: 435; Interpretive           

      Statements; SINUS RHYTHM; NO PRIORS; Electronically Signed On 2017 8:31:04 EST by      

      Yefri Melendez;                                                                               

Radiology Order: Hand, Complete                                                                   

      Test: Hand, Complete                                                                        

      REASON FOR EXAMINATION: Deformity/Swelling; Clinical: Trauma. Deformity/Swelling.; ;        

      Technique: AP, lateral, bilateral oblique views left hand.; ; Findings: The osseous         

      structures and joint spaces are intact and normal. There; is no evidence for acute          

      fracture or dislocation. Surrounding soft tissues are; unremarkable. No subcutaneous        

      emphysema or radiodense foreign body.; ; Impression:; No acute fracture or                  

      dislocation.; ; ; Signed by; Nolan Piedra MD 2017 08:39 A;                          

Outcome:                                                                                          

03:17 Discharge ordered by Provider.                                                          mm11

04:25 Discharge Assessment: Patient awake, alert and oriented x 3. No cognitive and/or        mlc 

      functional deficits noted. Patient verbalized understanding of disposition                  

      instructions. patient administered narcotics - no. The following High Risk Discharge        

      criteria are identified: None. Discharged to home ambulatory. Condition: good               

      Condition: stable. Discharge instructions given to patient, Instructed on discharge         

      instructions, follow up and referral plans. medication usage, Demonstrated                  

      understanding of instructions, medications, Pt was receptive of discharge instructions/     

      teaching. Prescriptions given X 2. No special radiology studies were completed.             

      Property sent home with patient.                                                            

04:27 Patient left the ED.                                                                    [facility] 

                                                                                                  

Signatures:                                                                                       

Dispatcher [employer]                           EDMS                                                 

Rachana Pepper, Reg                  Reg  Sherry Cox, DO                    DO   mm11                                                 

Katie Gómez, Buffy Mcnulty,RN                       RN   dsf                                                  

[place]                            gr2                                                  

Tara Schwab RN                          RN   Rolf Gloria                              5                                                  

Taryn Becerra                                                  

                                                                                                  

**************************************************************************************************



*** Chart Complete ***
[facility]

## 2017-02-21 NOTE — EDDOCDS
Physician Documentation                                                                           

[facility]                                                                         

Name: Laci Healy                                                                              

Age: 61 yrs                                                                                       

Sex: Male                                                                                         

: 1955                                                                                   

MRN: [id number]                                                                                     

Arrival Date: 2017                                                                          

Time: 22:32                                                                                       

Account#: [id number]                                                                              

Bed 12                                                                                            

Private MD: NO PRIMARY PHYSICIAN, .                                                               

Disposition:                                                                                      

17 03:17 Discharged to Home/Self Care. Impression: Gout, Chest pain, unspecified.           

- Condition is Stable.                                                                            

- Discharge Instructions: Gout, Gout, Easy-to-Read.                                               

- Prescriptions for Colchicine 0.6 mg Oral Tablet - take 1 tablet by ORAL route as                

directed take 2 tabs at gout onset, then 1 tab q1h prn x 2 doses maximum; 30 tablet.            

indomethacin 50 mg Oral Capsule - take 1 capsule by ORAL route 3 times per day with             

food; 30 capsule.                                                                               

- Medication Reconciliation, Local Pharmacy Hours form.                                           

- Follow up: Randy Steiner MD; When: Call to arrange an appointment; Reason:                     

Continuance of care.                                                                            

- Problem is an acute exacerbation.                                                               

- Symptoms have improved.                                                                         

                                                                                                  

                                                                                                  

                                                                                                  

Historical:                                                                                       

- Allergies: PENICILLINS (vomit blood );                                                          

- Home Meds:                                                                                      

1. pt does not know what he takes                                                               

- PMHx: Arthritis; back pain;                                                                     

- PSHx: none;                                                                                     

- Social history: Smoking status: Chewing Tobacco No barriers to communication noted,             

The patient speaks fluent English, Speaks appropriately for age.                                

- Family history: Not pertinent.                                                                  

- : The pt / caregiver states he / she is not on anticoagulants. Home medication list             

is obtained from the patient.                                                                   

- Exposure Risk Screening:: None identified.                                                      

                                                                                                  

                                                                                                  

Vital Signs:                                                                                      

                                                                                             

22:36  / 88; Pulse 83; Resp 18 S; Temp 96.3(O); Pulse Ox 100% on R/A; Weight 99.79 kg / gr2 

      220 lbs (R); Height 5 ft. 9 in. (175.26 cm) (R); Pain 10/10;                                

22:54  / 94 (auto/);                                                                    mlc 

22:58 Pulse 82 MON; Pulse Ox 96% ;                                                            mlc 

23:36  / 82 (auto/);                                                                    mlc 

23:36 Pulse 74 MON; Pulse Ox 92% ;                                                            mlc 

                                                                                             

00:06 Pulse 72 MON; Pulse Ox 94% ;                                                            mlc 

00:06  / 80 (auto/);                                                                    mlc 

00:36 Pulse 74 MON; Pulse Ox 94% ;                                                            mlc 

00:36  / 78 (auto/);                                                                    mlc 

01:06 Pulse 70 MON; Pulse Ox 94% ;                                                            mlc 

01:06  / 79 (auto/);                                                                    mlc 

01:18 Pulse 70 MON; Pulse Ox 94% ;                                                            mlc 

01:36  / 79 (auto/);                                                                    mlc 

01:36 Pulse 76 MON; Pulse Ox 94% ;                                                            mlc 

02:06  / 76 (auto/);                                                                    mlc 

02:06 Pulse 70 MON; Pulse Ox 94% ;                                                            mlc 

02:36  / 76 (auto/);                                                                    mlc 

02:36 Pulse 76 MON; Pulse Ox 94% ;                                                            mlc 

03:05 Pulse 70 MON; Pulse Ox 94% ;                                                            mlc 

03:06  / 76 (auto/);                                                                    mlc 

03:36  / 81 (auto/);                                                                    mlc 

03:36 Pulse 76 MON; Pulse Ox 96% ;                                                            mlc 

03:41 Pulse 74 MON; Pulse Ox 95% ;                                                            mlc 

03:41  / 74 (auto/);                                                                    mlc 

03:43  / 74; Pulse 73; Resp 18; Temp 98.3(O); Pulse Ox 96% on R/A; Pain 10/10;          amelia 

04:05 Pulse 64 MON; Pulse Ox 95% ;                                                            mlc 

04:25  / 75; Pulse 68; Resp 16; Temp 98.4; Pulse Ox 95% ; Pain 7/10;                    mlc 

                                                                                             

22:36 Body Mass Index 32.49 (99.79 kg, 175.26 cm)                                             gr2 

                                                                                                  

MDM:                                                                                              

                                                                                             

23:17 Cardiac Monitor/Pulse Ox/q 30 min VS ordered.                                           mm11

23:17 IV Saline Lock ordered.                                                                 mm11

23:17 Rhythm Strip to chart ordered.                                                          mm11

23:17 Undress patient appropriately for examination ordered.                                  mm11

23:17 Chest, 2 View (pa\E\lat) Ordered.                                                         EDMS

23:17 Basic Metabolic Profile Ordered.                                                        EDMS

23:17 CBC with Diff Ordered.                                                                  EDMS

23:17 Cardiac Injury Profile Ordered.                                                         EDMS

23:17 Troponin Ordered.                                                                       EDMS

23:18 ECG WITH READING ER PHYS+CARDIAG ordered.                                               EDMS

23:19 Hand, Complete Ordered.                                                                 EDMS

                                                                                             

00:00 Financial registration complete.                                                        pm4 

00:29 Basic Metabolic Profile Reviewed.                                                       mm11

00:29 CBC with Diff Reviewed.                                                                 mm11

00:29 Cardiac Injury Profile Reviewed.                                                        mm11

00:29 Troponin Reviewed.                                                                      mm11

01:24 [facility] Payment Agreement was scanned into [employer] and attached to record.               gjb 

03:18 ketorolac 30 mg IVP once ordered.                                                       mm11

04:05 ketorolac 30 mg IVP once ordered.                                                       [facility] 

08:52 T-Sheet-- Draft Copy was scanned into [employer] and attached to record.                   jp5 

                                                                                             

11:06 ECG/EKG was scanned into [employer] and attached to record.                                gb  

                                                                                                  

Administered Medications:                                                                         

                                                                                             

04:05 Drug: ketorolac 30 mg [ketorolac 30 mg/mL (1 mL) injection solution (1 mL)] Route: IVP; mlc 

      Site: right antecubital;                                                                    

04:24 Follow up: Response: Pain is decreased                                                  mlc 

                                                                                                  

                                                                                                  

Signatures:                                                                                       

Dispatcher [employer]                           EDMS                                                 

Rachana Pepper, Reg                  Reg  gb                                                   

Hemant Veronica,                     DO   mm11                                                 

Buffy Baires RN                       RN   dsTara Burk RN                          RN   [facility]                                                  

Rolf Bradley                              5                                                  

Taryn Becerra                               gjb                                                  

[place], Reg                     Reg  pm4                                                  

                                                                                                  

The chart was reviewed and I authenticate all verbal orders and agree with the evaluation and 
treatment provided.Attachments:

01:24 [facility] Payment Agreement                                                                gjb 

08:52 T-Sheet-- Draft Copy                                                                    5 

                                                                                             

11:06 ECG/EKG                                                                                 gb  

                                                                                                  

**************************************************************************************************



*** Chart Complete ***
MTDD

## 2017-08-07 ENCOUNTER — HOSPITAL ENCOUNTER (EMERGENCY)
Dept: HOSPITAL 53 - M ED | Age: 62
Discharge: HOME | End: 2017-08-07
Payer: MEDICARE

## 2017-08-07 VITALS — HEIGHT: 68 IN | WEIGHT: 202.43 LBS | BODY MASS INDEX: 30.68 KG/M2

## 2017-08-07 VITALS — DIASTOLIC BLOOD PRESSURE: 97 MMHG | SYSTOLIC BLOOD PRESSURE: 162 MMHG

## 2017-08-07 DIAGNOSIS — Z88.0: ICD-10-CM

## 2017-08-07 DIAGNOSIS — R42: ICD-10-CM

## 2017-08-07 DIAGNOSIS — Z79.899: ICD-10-CM

## 2017-08-07 DIAGNOSIS — I10: ICD-10-CM

## 2017-08-07 DIAGNOSIS — R00.1: Primary | ICD-10-CM

## 2017-08-07 DIAGNOSIS — M06.9: ICD-10-CM

## 2017-08-07 LAB
ANION GAP SERPL CALC-SCNC: 8 MEQ/L (ref 8–16)
BASOPHILS # BLD AUTO: 0.1 K/MM3 (ref 0–0.2)
BASOPHILS NFR BLD AUTO: 1.1 % (ref 0–1)
BUN SERPL-MCNC: 12 MG/DL (ref 7–18)
CALCIUM SERPL-MCNC: 8.8 MG/DL (ref 8.8–10.2)
CHLORIDE SERPL-SCNC: 105 MEQ/L (ref 98–107)
CO2 SERPL-SCNC: 28 MEQ/L (ref 21–32)
CREAT SERPL-MCNC: 0.88 MG/DL (ref 0.7–1.3)
EOSINOPHIL # BLD AUTO: 0.4 K/MM3 (ref 0–0.5)
EOSINOPHIL NFR BLD AUTO: 3.2 % (ref 0–3)
ERYTHROCYTE [DISTWIDTH] IN BLOOD BY AUTOMATED COUNT: 14.6 % (ref 11.5–14.5)
GFR SERPL CREATININE-BSD FRML MDRD: > 60 ML/MIN/{1.73_M2} (ref 49–?)
GLUCOSE SERPL-MCNC: 88 MG/DL (ref 80–110)
INR PPP: 1
LARGE UNSTAINED CELL #: 0.3 K/MM3 (ref 0–0.4)
LARGE UNSTAINED CELL %: 2.5 % (ref 0–4)
LYMPHOCYTES # BLD AUTO: 1.7 K/MM3 (ref 1.5–4.5)
LYMPHOCYTES NFR BLD AUTO: 11.9 % (ref 24–44)
MCH RBC QN AUTO: 32.4 PG (ref 27–33)
MCHC RBC AUTO-ENTMCNC: 34.5 G/DL (ref 32–36.5)
MCV RBC AUTO: 93.8 FL (ref 80–96)
MONOCYTES # BLD AUTO: 1 K/MM3 (ref 0–0.8)
MONOCYTES NFR BLD AUTO: 8.7 % (ref 0–5)
NEUTROPHILS # BLD AUTO: 8.4 K/MM3 (ref 1.8–7.7)
NEUTROPHILS NFR BLD AUTO: 72.7 % (ref 36–66)
PLATELET # BLD AUTO: 268 K/MM3 (ref 150–450)
POTASSIUM SERPL-SCNC: 4.1 MEQ/L (ref 3.5–5.1)
SODIUM SERPL-SCNC: 141 MEQ/L (ref 136–145)
WBC # BLD AUTO: 11.6 K/MM3 (ref 4–10)

## 2017-08-07 NOTE — REP
Chest two views

 

HISTORY: Dizziness

 

Comparison: 02/18/2017

 

Linear density is present in the left lower lobe consistent with atelectasis or

scar.  The right lung is clear. The heart is normal in size.  The pulmonary

vasculature is normal in appearance. Degenerative change is present in the

spine.

 

IMPRESSION: Left lower lobe atelectasis or scar.

 

 

Signed by

Laci Oliver MD 08/07/2017 07:18 P

## 2017-08-07 NOTE — REP
CT Head without contrast

 

HISTORY:  Infarction

 

COMPARISON: None

 

There is no intraparenchymal hemorrhage, acute infarct,  mass or midline shift.

The ventricular system and cortical sulci are dilated consistent with minimal

volume loss.  There is no extra cerebral collection.  There is no fracture.  The

visualized sinuses are clear.

 

IMPRESSION:  Minimal volume loss.

 

 

 

 

Signed by

Laci Oliver MD 08/07/2017 06:21 P

## 2017-08-08 NOTE — ECGEPIP
Stationary ECG Study

                           [facility] - ED

                                       

                                       Test Date:    2017

Pat Name:     SANDRA ALTAMIRANO           Department:   

Patient ID:   [id number]                 Room:         -

Gender:       M                        Technician:   rn

:          1955               Requested By: SOMMER TRIMBLE PA-C

Order Number: [id number]     Reading MD:   Alecia Apple

                                 Measurements

Intervals                              Axis          

Rate:         55                       P:            50

AR:           157                      QRS:          -15

QRSD:         97                       T:            17

QT:           436                                    

QTc:          419                                    

                           Interpretive Statements

SINUS BRADYCARDIA

MINIMAL VOLTAGE CRITERIA FOR LVH, CONSIDER NORMAL VARIANT

DECREASED RATE 17

Electronically Signed On 2017 10:39:00 EDT by Alecia Apple

## 2017-08-28 ENCOUNTER — HOSPITAL ENCOUNTER (OUTPATIENT)
Dept: HOSPITAL 53 - M LABNEURO | Age: 62
End: 2017-08-28
Attending: PSYCHIATRY & NEUROLOGY
Payer: MEDICARE

## 2017-08-28 DIAGNOSIS — F03.90: ICD-10-CM

## 2017-08-28 DIAGNOSIS — F32.3: ICD-10-CM

## 2017-08-28 DIAGNOSIS — E07.9: Primary | ICD-10-CM

## 2017-08-28 DIAGNOSIS — Z72.89: ICD-10-CM

## 2017-08-28 DIAGNOSIS — Z11.3: ICD-10-CM

## 2017-08-28 DIAGNOSIS — F41.9: ICD-10-CM

## 2017-08-28 LAB
ADD MANUAL DIFFER: YES
ALBUMIN SERPL BCG-MCNC: 3.4 GM/DL (ref 3.2–5.2)
ALBUMIN/GLOB SERPL: 0.89 {RATIO} (ref 1–1.93)
ALP SERPL-CCNC: 85 U/L (ref 45–117)
ALT SERPL W P-5'-P-CCNC: 20 U/L (ref 12–78)
ANION GAP SERPL CALC-SCNC: 10 MEQ/L (ref 8–16)
AST SERPL-CCNC: 13 U/L (ref 15–37)
BILIRUB SERPL-MCNC: 0.9 MG/DL (ref 0.2–1)
BUN SERPL-MCNC: 8 MG/DL (ref 7–18)
CALCIUM SERPL-MCNC: 8.5 MG/DL (ref 8.8–10.2)
CHLORIDE SERPL-SCNC: 102 MEQ/L (ref 98–107)
CO2 SERPL-SCNC: 27 MEQ/L (ref 21–32)
CREAT SERPL-MCNC: 0.73 MG/DL (ref 0.7–1.3)
EOSINOPHIL NFR BLD MANUAL: 1 % (ref 0–5)
ERYTHROCYTE [DISTWIDTH] IN BLOOD BY AUTOMATED COUNT: 13.8 % (ref 11.5–14.5)
ERYTHROCYTE [SEDIMENTATION RATE] IN BLOOD BY WESTERGREN METHOD: 51 MM/HR (ref 0–20)
FOLATE SERPL-MCNC: > 24 NG/ML
GFR SERPL CREATININE-BSD FRML MDRD: > 60 ML/MIN/{1.73_M2} (ref 49–?)
GLUCOSE SERPL-MCNC: 87 MG/DL (ref 80–110)
MCH RBC QN AUTO: 32.2 PG (ref 27–33)
MCHC RBC AUTO-ENTMCNC: 34.1 G/DL (ref 32–36.5)
MCV RBC AUTO: 94.3 FL (ref 80–96)
NEUTS BAND NFR BLD: 1 % (ref ?–11)
PLATELET # BLD AUTO: 470 K/MM3 (ref 150–450)
POTASSIUM SERPL-SCNC: 3.8 MEQ/L (ref 3.5–5.1)
PROT SERPL-MCNC: 7.2 GM/DL (ref 6.4–8.2)
RBC MORPH BLD: NORMAL
SODIUM SERPL-SCNC: 139 MEQ/L (ref 136–145)
T3RU NFR SERPL: 35 % (ref 33–40)
T4 SERPL-MCNC: 9.2 UG/DL (ref 4.5–12)
VIT B12 SERPL-MCNC: 435 PG/ML
WBC # BLD AUTO: 12.9 K/MM3 (ref 4–10)

## 2017-09-01 LAB — A-TOCOPHEROL VIT E SERPL-MCNC: 8.3 MG/L (ref 5.3–17.5)

## 2018-09-17 ENCOUNTER — HOSPITAL ENCOUNTER (EMERGENCY)
Dept: HOSPITAL 53 - M ED | Age: 63
Discharge: HOME | End: 2018-09-17
Payer: MEDICARE

## 2018-09-17 DIAGNOSIS — R53.1: ICD-10-CM

## 2018-09-17 DIAGNOSIS — G62.9: Primary | ICD-10-CM

## 2018-09-17 DIAGNOSIS — M19.90: ICD-10-CM

## 2018-09-17 DIAGNOSIS — Z82.49: ICD-10-CM

## 2018-09-17 DIAGNOSIS — Z88.0: ICD-10-CM

## 2018-09-17 DIAGNOSIS — Z79.899: ICD-10-CM

## 2018-09-17 LAB
ALBUMIN/GLOBULIN RATIO: 0.62 (ref 1–1.93)
ALBUMIN: 2.6 GM/DL (ref 3.2–5.2)
ALKALINE PHOSPHATASE: 78 U/L (ref 45–117)
ALT SERPL W P-5'-P-CCNC: 12 U/L (ref 12–78)
AMMONIA PLAS-SCNC: 46 UMOL/L (ref ?–32)
ANION GAP: 12 MEQ/L (ref 8–16)
AST SERPL-CCNC: 11 U/L (ref 7–37)
BASO #: 0.2 10^3/UL (ref 0–0.2)
BASO %: 1.1 % (ref 0–1)
BILIRUB CONJ SERPL-MCNC: 0.3 MG/DL (ref 0–0.2)
BILIRUBIN,TOTAL: 1 MG/DL (ref 0.2–1)
BLOOD UREA NITROGEN: 9 MG/DL (ref 7–18)
CALCIUM LEVEL: 8.5 MG/DL (ref 8.8–10.2)
CARBON DIOXIDE LEVEL: 24 MEQ/L (ref 21–32)
CHLORIDE LEVEL: 102 MEQ/L (ref 98–107)
CK MB CFR.DF SERPL CALC: 4.17
CK SERPL-CCNC: 24 U/L (ref 39–308)
CK-MB VALUE MASS: < 1 NG/ML (ref ?–3.6)
CREATININE FOR GFR: 0.76 MG/DL (ref 0.7–1.3)
EOS #: 0.3 10^3/UL (ref 0–0.5)
EOSINOPHIL NFR BLD AUTO: 2 % (ref 0–3)
ETHYL ALCOHOL (ETHANOL): < 0.003 % (ref 0–0.01)
GFR SERPL CREATININE-BSD FRML MDRD: > 60 ML/MIN/{1.73_M2} (ref 49–?)
GLUCOSE, FASTING: 93 MG/DL (ref 70–100)
HEMATOCRIT: 40.2 % (ref 42–52)
HEMOGLOBIN: 13 G/DL (ref 13.5–17.5)
IMMATURE GRANULOCYTE %: 2.2 % (ref 0–3)
LYMPH #: 1.3 10^3/UL (ref 1.5–4.5)
LYMPH %: 9.7 % (ref 24–44)
MAGNESIUM LEVEL: 2 MG/DL (ref 1.8–2.4)
MEAN CORPUSCULAR HEMOGLOBIN: 27.5 PG (ref 27–33)
MEAN CORPUSCULAR HGB CONC: 32.3 G/DL (ref 32–36.5)
MEAN CORPUSCULAR VOLUME: 85 FL (ref 80–96)
MONO #: 1.5 10^3/UL (ref 0–0.8)
MONO %: 10.7 % (ref 0–5)
NEUTROPHILS #: 10.3 10^3/UL (ref 1.8–7.7)
NEUTROPHILS %: 74.3 % (ref 36–66)
NRBC BLD AUTO-RTO: 0 % (ref 0–0)
PLATELET COUNT, AUTOMATED: 427 10^3/UL (ref 150–450)
POTASSIUM SERUM: 3.7 MEQ/L (ref 3.5–5.1)
RED BLOOD COUNT: 4.73 10^6/UL (ref 4.3–6.1)
RED CELL DISTRIBUTION WIDTH: 13.2 % (ref 11.5–14.5)
SODIUM LEVEL: 138 MEQ/L (ref 136–145)
THYROID STIMULATING HORMONE: 1.33 UIU/ML (ref 0.36–3.74)
TOTAL PROTEIN: 6.8 GM/DL (ref 6.4–8.2)
TROPONIN I: < 0.02 NG/ML (ref ?–0.1)
WHITE BLOOD COUNT: 13.9 10^3/UL (ref 4–10)

## 2018-09-17 PROCEDURE — 70551 MRI BRAIN STEM W/O DYE: CPT

## 2018-09-17 RX ADMIN — SODIUM CHLORIDE 1 MLS/HR: 9 INJECTION, SOLUTION INTRAVENOUS at 13:15

## 2018-09-18 LAB — GLUCOSE BLDC GLUCOMTR-MCNC: 104 MG/DL (ref 80–115)

## 2018-09-19 LAB
B BURGDOR IGG+IGM SER-ACNC: <0.91 ISR (ref 0–0.9)
B BURGDOR IGM SER IA-ACNC: <0.8 INDEX (ref 0–0.79)

## 2018-11-20 ENCOUNTER — HOSPITAL ENCOUNTER (OUTPATIENT)
Dept: HOSPITAL 53 - M OPP | Age: 63
Discharge: HOME | End: 2018-11-20
Attending: INTERNAL MEDICINE
Payer: MEDICARE

## 2018-11-20 DIAGNOSIS — K92.1: ICD-10-CM

## 2018-11-20 DIAGNOSIS — K25.9: ICD-10-CM

## 2018-11-20 DIAGNOSIS — K57.30: ICD-10-CM

## 2018-11-20 DIAGNOSIS — R63.4: Primary | ICD-10-CM

## 2018-11-20 DIAGNOSIS — K64.8: ICD-10-CM

## 2018-11-20 DIAGNOSIS — K21.0: ICD-10-CM

## 2018-11-20 PROCEDURE — 43239 EGD BIOPSY SINGLE/MULTIPLE: CPT

## 2018-11-20 RX ADMIN — SODIUM CHLORIDE 1 MLS/HR: 9 INJECTION, SOLUTION INTRAVENOUS at 06:00

## 2018-12-05 ENCOUNTER — HOSPITAL ENCOUNTER (OUTPATIENT)
Dept: HOSPITAL 53 - M RAD | Age: 63
End: 2018-12-05
Attending: INTERNAL MEDICINE
Payer: MEDICARE

## 2018-12-05 DIAGNOSIS — R63.4: ICD-10-CM

## 2018-12-05 DIAGNOSIS — K57.30: ICD-10-CM

## 2018-12-05 DIAGNOSIS — K80.00: Primary | ICD-10-CM

## 2019-02-22 ENCOUNTER — HOSPITAL ENCOUNTER (OUTPATIENT)
Dept: HOSPITAL 53 - M OPP | Age: 64
Discharge: HOME | End: 2019-02-22
Attending: INTERNAL MEDICINE
Payer: MEDICARE

## 2019-02-22 VITALS — HEIGHT: 69 IN | WEIGHT: 162 LBS | BODY MASS INDEX: 23.99 KG/M2

## 2019-02-22 VITALS — SYSTOLIC BLOOD PRESSURE: 130 MMHG | DIASTOLIC BLOOD PRESSURE: 87 MMHG

## 2019-02-22 DIAGNOSIS — K29.70: ICD-10-CM

## 2019-02-22 DIAGNOSIS — K44.9: Primary | ICD-10-CM

## 2019-02-22 DIAGNOSIS — K31.4: ICD-10-CM

## 2019-02-22 NOTE — ROOR
________________________________________________________________________________

Patient Name: Laci Healy           Procedure Date: 2/22/2019 12:52 PM

MRN: [id number]                          Account Number: [id number]

YOB: 1955                Age: 63

Room: [facility]                            Gender: Male

Note Status: Finalized                 

________________________________________________________________________________

 

Procedure:           Upper GI endoscopy

Indications:         Follow-up of gastric ulcer

Providers:           David Garcia MD

Referring MD:        ADRIANE MARTELL MD

Requesting Provider: 

Medicines:           Monitored Anesthesia Care

Complications:       No immediate complications.

________________________________________________________________________________

Procedure:           Pre-Anesthesia Assessment:

                     - Prior to the procedure, a History and Physical was 

                     performed, and patient medications and allergies were 

                     reviewed. The patient is competent. The risks and 

                     benefits of the procedure and the sedation options and 

                     risks were discussed with the patient. All questions were 

                     answered and informed consent was obtained. Patient 

                     identification and proposed procedure were verified by 

                     the physician, the nurse and the anesthesiologist in the 

                     procedure room. Mental Status Examination: alert and 

                     oriented. Airway Examination: normal oropharyngeal airway 

                     and neck mobility. Respiratory Examination: clear to 

                     auscultation. CV Examination: normal. Prophylactic 

                     Antibiotics: The patient does not require prophylactic 

                     antibiotics. Prior Anticoagulants: The patient has taken 

                     no previous anticoagulant or antiplatelet agents. ASA 

                     Grade Assessment: II - A patient with mild systemic 

                     disease. After reviewing the risks and benefits, the 

                     patient was deemed in satisfactory condition to undergo 

                     the procedure. The anesthesia plan was to use monitored 

                     anesthesia care (MAC). Immediately prior to 

                     administration of medications, the patient was 

                     re-assessed for adequacy to receive sedatives. The heart 

                     rate, respiratory rate, oxygen saturations, blood 

                     pressure, adequacy of pulmonary ventilation, and response 

                     to care were monitored throughout the procedure. The 

                     physical status of the patient was re-assessed after the 

                     procedure.

                     The Endoscope was introduced through the mouth, and 

                     advanced to the second part of duodenum. The upper GI 

                     endoscopy was accomplished without difficulty. The 

                     patient tolerated the procedure well.

                                                                                

Findings:

     A small hiatal hernia was present.

     Diffuse mild inflammation characterized by erythema and granularity was 

     found in the gastric antrum. Biopsies were taken with a cold forceps for 

     histology. Verification of patient identification for the specimen was 

     done by the physician and nurse using the patient's name, birth date and 

     medical record number. Estimated blood loss was minimal.

     A 30 mm non-bleeding diverticulum was found in the gastric fundus. (could 

     be a possible paraesophageal hiatal hernia, examined inside the 

     diverticulum - no ulceration or mass)

     The duodenal bulb and second portion of the duodenum were normal.

                                                                                

Impression:          - Small hiatal hernia.

                     - Gastritis. Biopsied.

                     - Gastric diverticulum.

                     - Normal duodenal bulb and second portion of the duodenum.

Recommendation:      - Patient has a contact number available for emergencies. 

                     The signs and symptoms of potential delayed complications 

                     were discussed with the patient. Return to normal 

                     activities tomorrow. Written discharge instructions were 

                     provided to the patient.

                     - Resume previous diet.

                     - Continue present medications.

                     - Await pathology results.

                     - Follow an antireflux regimen.

                     - Return to GI clinic in [facility] 

                     (address [address], [address] [address]) 

                     in 4 -- 6 weeks. Please call GI clinic @ [phone] for 

                     apppointment date and time.

                     - Return to primary care physician.

                                                                                

 

David Garcia MD

_______________________

David Garcia MD

2/22/2019 1:41:15 PM

This report has been signed electronically.

Number of Addenda: 0

 

Note Initiated On: 2/22/2019 12:52 PM

Estimated Blood Loss:

     Estimated blood loss was minimal.

## 2021-11-08 ENCOUNTER — HOSPITAL ENCOUNTER (EMERGENCY)
Dept: HOSPITAL 53 - M ED | Age: 66
Discharge: ANOTHER HOSPITAL | End: 2021-11-08
Payer: MEDICARE

## 2021-11-08 VITALS — DIASTOLIC BLOOD PRESSURE: 72 MMHG | SYSTOLIC BLOOD PRESSURE: 120 MMHG

## 2021-11-08 DIAGNOSIS — Z79.899: ICD-10-CM
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
## 2021-11-08 DIAGNOSIS — J32.0: ICD-10-CM

## 2021-11-08 DIAGNOSIS — Z88.0: ICD-10-CM

## 2021-11-08 DIAGNOSIS — K21.9: ICD-10-CM
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
## 2021-11-08 DIAGNOSIS — S06.369A: Primary | ICD-10-CM

## 2021-11-08 DIAGNOSIS — M06.9: ICD-10-CM

## 2021-11-08 LAB
ALBUMIN SERPL BCG-MCNC: 3.7 GM/DL (ref 3.2–5.2)
ALT SERPL W P-5'-P-CCNC: 34 U/L (ref 12–78)
APTT BLD: 26.3 SECONDS (ref 25.9–37)
BASOPHILS # BLD AUTO: 0.1 10^3/UL (ref 0–0.2)
BASOPHILS NFR BLD AUTO: 0.9 % (ref 0–1)
BILIRUB CONJ SERPL-MCNC: 0.3 MG/DL (ref 0–0.2)
BILIRUB SERPL-MCNC: 1.8 MG/DL (ref 0.2–1)
BUN SERPL-MCNC: 13 MG/DL (ref 7–18)
CALCIUM SERPL-MCNC: 8.9 MG/DL (ref 8.8–10.2)
CHLORIDE SERPL-SCNC: 110 MEQ/L (ref 98–107)
CO2 SERPL-SCNC: 25 MEQ/L (ref 21–32)
CREAT SERPL-MCNC: 0.99 MG/DL (ref 0.7–1.3)
EOSINOPHIL # BLD AUTO: 0.2 10^3/UL (ref 0–0.5)
EOSINOPHIL NFR BLD AUTO: 1.7 % (ref 0–3)
GFR SERPL CREATININE-BSD FRML MDRD: > 60 ML/MIN/{1.73_M2} (ref 49–?)
GLUCOSE SERPL-MCNC: 108 MG/DL (ref 70–100)
HCT VFR BLD AUTO: 47.2 % (ref 42–52)
HGB BLD-MCNC: 15.6 G/DL (ref 13.5–17.5)
INR PPP: 1.04
LYMPHOCYTES # BLD AUTO: 0.8 10^3/UL (ref 1.5–5)
LYMPHOCYTES NFR BLD AUTO: 9.1 % (ref 24–44)
MCH RBC QN AUTO: 31.1 PG (ref 27–33)
MCHC RBC AUTO-ENTMCNC: 33.1 G/DL (ref 32–36.5)
MCV RBC AUTO: 94.2 FL (ref 80–96)
MONOCYTES # BLD AUTO: 1.1 10^3/UL (ref 0–0.8)
MONOCYTES NFR BLD AUTO: 12.6 % (ref 2–8)
NEUTROPHILS # BLD AUTO: 6.5 10^3/UL (ref 1.5–8.5)
NEUTROPHILS NFR BLD AUTO: 75.1 % (ref 36–66)
PLATELET # BLD AUTO: 189 10^3/UL (ref 150–450)
POTASSIUM SERPL-SCNC: 3.3 MEQ/L (ref 3.5–5.1)
PROT SERPL-MCNC: 6.3 GM/DL (ref 6.4–8.2)
PROTHROMBIN TIME: 14 SECONDS (ref 12.7–14.5)
RBC # BLD AUTO: 5.01 10^6/UL (ref 4.3–6.1)
SODIUM SERPL-SCNC: 144 MEQ/L (ref 136–145)
TSH SERPL DL<=0.005 MIU/L-ACNC: 0.46 UIU/ML (ref 0.36–3.74)
WBC # BLD AUTO: 8.6 10^3/UL (ref 4–10)

## 2021-11-08 PROCEDURE — 96367 TX/PROPH/DG ADDL SEQ IV INF: CPT

## 2021-11-08 PROCEDURE — 51702 INSERT TEMP BLADDER CATH: CPT

## 2021-11-08 PROCEDURE — 71045 X-RAY EXAM CHEST 1 VIEW: CPT

## 2021-11-08 PROCEDURE — 94760 N-INVAS EAR/PLS OXIMETRY 1: CPT

## 2021-11-08 PROCEDURE — 70450 CT HEAD/BRAIN W/O DYE: CPT

## 2021-11-08 PROCEDURE — 86901 BLOOD TYPING SEROLOGIC RH(D): CPT

## 2021-11-08 PROCEDURE — 86850 RBC ANTIBODY SCREEN: CPT

## 2021-11-08 PROCEDURE — 96375 TX/PRO/DX INJ NEW DRUG ADDON: CPT

## 2021-11-08 PROCEDURE — 84443 ASSAY THYROID STIM HORMONE: CPT

## 2021-11-08 PROCEDURE — 96365 THER/PROPH/DIAG IV INF INIT: CPT

## 2021-11-08 PROCEDURE — 80047 BASIC METABLC PNL IONIZED CA: CPT

## 2021-11-08 PROCEDURE — 85610 PROTHROMBIN TIME: CPT

## 2021-11-08 PROCEDURE — 85730 THROMBOPLASTIN TIME PARTIAL: CPT

## 2021-11-08 PROCEDURE — 86900 BLOOD TYPING SEROLOGIC ABO: CPT

## 2021-11-08 PROCEDURE — 80076 HEPATIC FUNCTION PANEL: CPT

## 2021-11-08 PROCEDURE — 85025 COMPLETE CBC W/AUTO DIFF WBC: CPT

## 2021-11-08 PROCEDURE — 93041 RHYTHM ECG TRACING: CPT

## 2021-11-08 PROCEDURE — 99285 EMERGENCY DEPT VISIT HI MDM: CPT

## 2021-11-08 PROCEDURE — 31500 INSERT EMERGENCY AIRWAY: CPT

## 2021-11-08 PROCEDURE — 80048 BASIC METABOLIC PNL TOTAL CA: CPT

## 2021-11-08 PROCEDURE — 72125 CT NECK SPINE W/O DYE: CPT

## 2021-11-08 NOTE — REP
INDICATION:

ams.



COMPARISON:

08/07/2017



TECHNIQUE:

Axial soft tissue and bone window settings and coronal reconstructions reviewed.



FINDINGS:

The left basal ganglia and adjacent white matter tracks of the frontotemporal region

show an intraparenchymal hemorrhage 4.7 x 2.9 x 3.7 cm.  It is slightly effacing the

frontal and posterior horn of the left lateral ventricle with a 2-3 mm midline shift

to the right.  The right lateral ventricle 3rd and 4th ventricles are intact.  The

basal cisterns are intact.  See no significant atrophy.  There is effacement of the

temporal horn of left lateral ventricle.  Gray-white junction differentiation was

maintained elsewhere.  There is no extra-axial hemorrhage.  No intraventricular bleed.

Posterior fossa without bleed.  Cerebellum was unremarkable.  The brainstem intact.

The bone windows show mastoids, the skull base and calvarium without fracture or focal

lesion.  Some minor ethmoid sinus mucosal thickening and mucosal thickening in the

right maxillary antrum.



IMPRESSION:

1. There is a 4.7 x 2.9 x 3.7 cm intraparenchymal hemorrhage involving the basal

ganglia and white matter tracts on the left causing partial effacement of the left

lateral ventricle and midline shift to the right of about 2-3 mm.  There is no

subarachnoid or subdural hemorrhage, other intraparenchymal or intraventricular

hemorrhage evident.  The tracheal air size in and sulci normal for age.  Posterior

fossa intact.  No lacunar or vascular territory infarcts evident.

2. Some ethmoid sinus mucosal thickening as well as right maxillary sinus disease but

no skull base, mastoid or calvarial abnormality.

3. I spoke to Dr. Oneal by phone at 1:10 p.m. during the conclusion of this dictation.

4. Critical result: Physician informed by phone as above.





<Electronically signed by Zhou Rain > 11/08/21 [id number]

## 2021-11-08 NOTE — CCD
Summarization Of Episode

                             Created on: 2021



Laci Altamirano

External Reference #: [id number]

: 1955

Sex: Male



Demographics





                          Address                   [address] [place]  [address]

 

                          Home Phone                [phone]

 

                          Preferred Language        English

 

                          Marital Status            Unknown

 

                          Restorationism Affiliation     Unknown

 

                          Race                      White

 

                          Ethnic Group              Not  or 





Author





                          Author                    [facility]

 

                          Organization              [facility]

 

                          Address                   Unknown

 

                          Phone                     Unavailable







Support





                Name            Relationship    Address         Phone

 

                    MIGUEL ALTAMIRANO  Next Of Kin         [id number] [facility] [address]

[place]  [address]                    [phone]

 

                    ALLEN RODRIGUEZ      Next Of Kin         [address]

[place]  [address]                       [phone]

 

                DISABLED        Next Of Kin     Unknown         Unavailable

 

                    JOVITA ALTAMIRANO    [place]  [address]                    [phone]

 

                    MIGUEL ALTAMIRANO  ECON                [address] [place]  [address]                       [phone]

 

                    Andrez Rodriguez     ECON                [address] [place]  [address]               Unavailable







Care Team Providers





                    Care Team Member Name Role                Phone

 

                    CODY MARTELL MD Unavailable         Unavailable

 

                    CODY MARTELL MD Unavailable         Unavailable

 

                    CODY MARTELL MD Unavailable         Unavailable

 

                    CODY MARTELL MD Unavailable         Unavailable

 

                    CODY MARTELL MD Unavailable         Unavailable

 

                    CODY MARTELL MD Unavailable         Unavailable

 

                    CODY MARTELL MD Unavailable         Unavailable

 

                    CODY MARTELL MD Unavailable         Unavailable

 

                    CODY MARTELL MD Unavailable         Unavailable

 

                    CODY MARTELL MD Unavailable         Unavailable

 

                    CODY MARTELL MD Unavailable         Unavailable

 

                    CODY MARTELL MD Unavailable         Unavailable

 

                    CODY MARTELL MD Unavailable         Unavailable

 

                    CODY MARTELL MD Unavailable         Unavailable

 

                    CODY MARTELL MD Unavailable         Unavailable

 

                    CODY MARTELL MD Unavailable         Unavailable

 

                    CODY MARTELL MD Unavailable         Unavailable

 

                    CODY MARTELL MD Unavailable         Unavailable

 

                    CODY MARTELL MD Unavailable         Unavailable

 

                    CODY MARTELL MD Unavailable         Unavailable

 

                    CODY MARTELL MD Unavailable         Unavailable

 

                    CODY MARTELL MD Unavailable         Unavailable

 

                    CODY MARTELL MD Unavailable         Unavailable

 

                    CODY MARTELL MD Unavailable         Unavailable

 

                    CODY MARTELL MD Unavailable         Unavailable

 

                    CODY MARTELL MD Unavailable         Unavailable

 

                    CODY MARTELL MD Unavailable         Unavailable

 

                    CODY MARTELL MD Unavailable         Unavailable

 

                    CODY MARTELL MD Unavailable         Unavailable

 

                    CODY MARTELL MD Unavailable         Unavailable

 

                    CODY MARTELL MD Unavailable         Unavailable

 

                    CODY MARTELL MD Unavailable         Unavailable

 

                    CODY MARTELL MD Unavailable         Unavailable

 

                    CODY MARTELL MD Unavailable         Unavailable

 

                    CODY MARTELL MD Unavailable         Unavailable

 

                    CODY MARTELL MD Unavailable         Unavailable

 

                    CODY MARTELL MD Unavailable         Unavailable

 

                    CODY MARTELL MD Unavailable         Unavailable

 

                    CODY MARTELL MD Unavailable         Unavailable

 

                    CODY MARTELL MD Unavailable         Unavailable

 

                    CODY MARTELL MD Unavailable         Unavailable

 

                    CODY MARTELL MD Unavailable         Unavailable

 

                    CODY MARTELL MD Unavailable         Unavailable

 

                    CODY MARTELL MD Unavailable         Unavailable

 

                    CODY MARTELL MD Unavailable         Unavailable

 

                    CODY MARTELL MD Unavailable         Unavailable

 

                    CODY MARTELL MD Unavailable         Unavailable

 

                    CODY MARTELL MD Unavailable         Unavailable

 

                    CODY MARTELL MD Unavailable         Unavailable

 

                    CODY MARTELL MD Unavailable         Unavailable

 

                    CODY MARTELL MD Unavailable         Unavailable

 

                    CODY MARTELL MD Unavailable         Unavailable

 

                    CODY MARTELL MD Unavailable         Unavailable

 

                    CODY MARTELL MD Unavailable         Unavailable

 

                    CODY MARTELL MD Unavailable         Unavailable

 

                    CODY MARTELL MD Unavailable         Unavailable

 

                    CODY MARTELL MD Unavailable         Unavailable

 

                    CODY MARTELL MD Unavailable         Unavailable

 

                    CODY MARTELL MD Unavailable         Unavailable

 

                    CODY MARTELL MD Unavailable         Unavailable

 

                    CODY MARTELL MD Unavailable         Unavailable

 

                    CODY MARTELL MD Unavailable         Unavailable

 

                    CODY MARTELL MD Unavailable         Unavailable

 

                    CODY MARTELL MD Unavailable         Unavailable

 

                    CODY MARTELL MD Unavailable         Unavailable

 

                    CODY MARTELL MD Unavailable         Unavailable

 

                    CODY MARTELL MD Unavailable         Unavailable

 

                    CODY MARTELL MD Unavailable         Unavailable

 

                    CODY MARTELL MD Unavailable         Unavailable

 

                    CODY MARTELL MD Unavailable         Unavailable

 

                    CODY MARTELL MD Unavailable         Unavailable

 

                    CODY MARTELL MD Unavailable         Unavailable

 

                    CODY MARTELL MD Unavailable         Unavailable

 

                    CODY MARTELL MD Unavailable         Unavailable

 

                    CODY MARTELL MD Unavailable         Unavailable

 

                    CODY MARTELL MD Unavailable         Unavailable

 

                    MCILVAIN, M MAREK PA Unavailable         Unavailable

 

                    MCILVAIN, M MAREK PA Unavailable         Unavailable

 

                    MCILVAIN, M MAREK PA Unavailable         Unavailable

 

                    MCILVAIN, M MAREK PA Unavailable         Unavailable

 

                    MCILVAIN, M MAREK PA Unavailable         Unavailable

 

                    MCILVAIN, M MAREK PA Unavailable         Unavailable

 

                    MCILVAIN, M MAREK PA Unavailable         Unavailable

 

                    MCILVAIN, M MAREK PA Unavailable         Unavailable

 

                    MCILVAIN, M MAREK PA Unavailable         Unavailable

 

                    MCILVAIN, M MAREK PA Unavailable         Unavailable

 

                    MCILVAIN, M MAREK PA Unavailable         Unavailable

 

                    MCILVAIN, M MAREK PA Unavailable         Unavailable

 

                    MCILVAIN, M MAREK PA Unavailable         Unavailable

 

                    MCILVAIN, M MAREK PA Unavailable         Unavailable

 

                    MCILVAIN, M MAREK PA Unavailable         Unavailable

 

                    MCILVAIN, M MAREK PA Unavailable         Unavailable

 

                    MCILVAIN, M MAREK PA Unavailable         Unavailable

 

                    MCILVAIN, M MAREK PA Unavailable         Unavailable

 

                    MCILVAIN, M MAREK PA Unavailable         Unavailable

 

                    MCILVAIN, M MAREK PA Unavailable         Unavailable

 

                    MCILVAIN, M MAREK PA Unavailable         Unavailable

 

                    MCILVAIN, M MAREK PA Unavailable         Unavailable

 

                    MCILVAIN, M MAREK PA Unavailable         Unavailable

 

                    MCILVAIN, M MAREK PA Unavailable         Unavailable

 

                    MCILVAIN, M MAREK PA Unavailable         Unavailable

 

                    MCILVAIN, M MAREK PA Unavailable         Unavailable

 

                    MCILVAIN, M MAREK PA Unavailable         Unavailable

 

                    MCILVAIN, M MAREK PA Unavailable         Unavailable

 

                    MCILVAIN, M MAREK PA Unavailable         Unavailable

 

                    MCILVAIN, M MAREK PA Unavailable         Unavailable

 

                    MCILVAIN, M MAREK PA Unavailable         Unavailable

 

                    MCILVAIN, M MAREK PA Unavailable         Unavailable

 

                    MCILVAIN, M MAREK PA Unavailable         Unavailable

 

                    MCILVAIN, M MAREK PA Unavailable         Unavailable

 

                    MCILVAIN, M MAREK PA Unavailable         Unavailable

 

                    MCILVAIN, M MAREK PA Unavailable         Unavailable

 

                    MCILVAIN, M MAREK PA Unavailable         Unavailable

 

                    MCILVAIN, M MAREK PA Unavailable         Unavailable

 

                    MCILVAIN, M MAREK PA Unavailable         Unavailable

 

                    MCILVAIN, M MAREK PA Unavailable         Unavailable

 

                    MCILVAIN, M MAREK PA Unavailable         Unavailable

 

                    MCILVAIN, M MAREK PA Unavailable         Unavailable

 

                    MCILVAIN, M MAREK PA Unavailable         Unavailable

 

                    MCILVAIN, M MAREK PA Unavailable         Unavailable

 

                    MCILVAIN, M MAREK PA Unavailable         Unavailable

 

                    MCILVAIN, M MAREK PA Unavailable         Unavailable

 

                    MCILVAIN, M MAREK PA Unavailable         Unavailable

 

                    MCILVAIN, M MAREK PA Unavailable         Unavailable

 

                    MCILVAIN, M MAREK PA Unavailable         Unavailable

 

                    MCILVAIN, M MAREK PA Unavailable         Unavailable

 

                    MCILVAIN, M MAREK PA Unavailable         Unavailable

 

                    MCILVAIN, M MAREK PA Unavailable         Unavailable

 

                    MCILVAIN, M MAREK PA Unavailable         Unavailable

 

                    MCILVAIN, M MAREK PA Unavailable         Unavailable

 

                    MCILVAIN, M MAREK PA Unavailable         Unavailable

 

                    MCILVAIN, M MAREK PA Unavailable         Unavailable

 

                    MCILVAIN, M MAREK PA Unavailable         Unavailable

 

                    MCILVAIN, M MAREK PA Unavailable         Unavailable

 

                    MCILVAIN, M MAREK PA Unavailable         Unavailable

 

                    MCILVAIN, M MAREK PA Unavailable         Unavailable

 

                    MCILVAIN, M MAREK PA Unavailable         Unavailable

 

                    MCILVAIN, M MAREK PA Unavailable         Unavailable

 

                    MCILVAIN, M MAREK PA Unavailable         Unavailable

 

                    MCILVAIN, M MAREK PA Unavailable         Unavailable

 

                    Kern Windy, A Tressa PA Unavailable         Unavailable

 

                    Kern Windy, A Tressa PA Unavailable         Unavailable

 

                    Kern Windy, A Tressa PA Unavailable         Unavailable

 

                    Kern Windy, A Tressa PA Unavailable         Unavailable

 

                    Kern Windy, A Tressa PA Unavailable         Unavailable

 

                    Kern Windy, A Tressa PA Unavailable         Unavailable

 

                    Kern Windy, A Tressa PA Unavailable         Unavailable

 

                    Kern Windy, A Tressa PA Unavailable         Unavailable

 

                    Kern Windy, A Tressa PA Unavailable         Unavailable

 

                    Kern Windy, A Tressa PA Unavailable         Unavailable

 

                    Kern Windy, A Tressa PA Unavailable         Unavailable

 

                    Kern Windy, A Tressa PA Unavailable         Unavailable

 

                    Kern Windy, A Tressa PA Unavailable         Unavailable

 

                    Kern Windy, A Tressa PA Unavailable         Unavailable

 

                    Kern Windy, A Tressa PA Unavailable         Unavailable

 

                    Kern Windy, A Tressa PA Unavailable         Unavailable

 

                    Kern Windy, A Tressa PA Unavailable         Unavailable

 

                    Kern Windy, A Tressa PA Unavailable         Unavailable

 

                    Kern Windy, A Tressa PA Unavailable         Unavailable

 

                    Kern Windy, A Tressa PA Unavailable         Unavailable

 

                    Kern Windy, A Tressa PA Unavailable         Unavailable

 

                    Kern Windy, A Tressa PA Unavailable         Unavailable

 

                    Kern Windy, A Tressa PA Unavailable         Unavailable

 

                    Kern Windy, A Tressa PA Unavailable         Unavailable

 

                    Kern Windy, A Tressa PA Unavailable         Unavailable

 

                    Kern Windy, A Tressa PA Unavailable         Unavailable

 

                    Kern Windy, A Tressa PA Unavailable         Unavailable

 

                    Kern Windy, A Tressa PA Unavailable         Unavailable

 

                    Kern Windy, A Tressa PA Unavailable         Unavailable

 

                    Kern Windy, A Tressa PA Unavailable         Unavailable

 

                    Kern Windy, A Tressa PA Unavailable         Unavailable

 

                    Kern Windy, A Tressa PA Unavailable         Unavailable

 

                    Kern Windy, A Tressa PA Unavailable         Unavailable

 

                    Kern Windy, A Tressa PA Unavailable         Unavailable

 

                    Kern Windy, A Tressa PA Unavailable         Unavailable

 

                    Kern Windy, A Tressa PA Unavailable         Unavailable

 

                    Kern Windy, A Tressa PA Unavailable         Unavailable

 

                    Kern Windy, A Tressa PA Unavailable         Unavailable

 

                    Kern Windy, A Tressa PA Unavailable         Unavailable

 

                    Kern Windy, A Tressa PA Unavailable         Unavailable

 

                    Kern Windy, A Tressa PA Unavailable         Unavailable

 

                    Kern Windy, A Tressa PA Unavailable         Unavailable

 

                    Kern Windy, A Tressa PA Unavailable         Unavailable

 

                    Kern Windy, A Tressa PA Unavailable         Unavailable

 

                    Kern Windy, A Tressa PA Unavailable         Unavailable

 

                    Kern Windy, A Tressa PA Unavailable         Unavailable

 

                    Kern Windy, A Tressa PA Unavailable         Unavailable

 

                    Kern Windy, A Tressa PA Unavailable         Unavailable

 

                    Kern Windy, A Tressa PA Unavailable         Unavailable

 

                    Kern Windy, A Tressa PA Unavailable         Unavailable

 

                    Kern Windy, A Tressa PA Unavailable         Unavailable

 

                    Lawanda Funk FNP Unavailable         Unavailable

 

                    Tibbits, Lawanda Pau FNP Unavailable         Unavailable

 

                    Tibbits, Lawanda Pau FNP Unavailable         Unavailable

 

                    Tibbits, Lawanda Pau FNP Unavailable         Unavailable

 

                    Tibbits, Lawanda Pau FNP Unavailable         Unavailable

 

                    Tibbits, Lawanda Pau FNP Unavailable         Unavailable

 

                    Tibbits, Lawanda Pau FNP Unavailable         Unavailable

 

                    Tibbits, Lawanda Pau FNP Unavailable         Unavailable

 

                    Tibbits, Lawanda Pau FNP Unavailable         Unavailable

 

                    Tibbits, Lawanda Pau FNP Unavailable         Unavailable

 

                    Tibbits, Lawanda Pau FNP Unavailable         Unavailable

 

                    Tibbits, Lawanda Pau FNP Unavailable         Unavailable

 

                    Tibbits, Lawanda Pau FNP Unavailable         Unavailable

 

                    Tibbits, Lawanda Pau FNP Unavailable         Unavailable

 

                    Tibbits, Lawanda Pau FNP Unavailable         Unavailable

 

                    Tibbits, Lawanda Pau FNP Unavailable         Unavailable

 

                    Tibbits, Lawanda Pau FNP Unavailable         Unavailable

 

                    Tibbits, Lawanda Pau FNP Unavailable         Unavailable

 

                    Tibbits, Lawanda Pau FNP Unavailable         Unavailable

 

                    Tibbits, Lawanda Pau FNP Unavailable         Unavailable

 

                    Tibbits, Lawanda Pau FNP Unavailable         Unavailable

 

                    Tibbits, Lawanda Pau FNP Unavailable         Unavailable

 

                    Tibbits, Lawanda Pau FNP Unavailable         Unavailable

 

                    Tibbits, Lawanda Pau FNP Unavailable         Unavailable

 

                    Tibbits, Lawanda Pau FNP Unavailable         Unavailable

 

                    Tibbits, Lawanda Pau FNP Unavailable         Unavailable

 

                    Tibbits, Lawanda Pau FNP Unavailable         Unavailable

 

                    Tibbits, Lawanda Pau FNP Unavailable         Unavailable

 

                    Tibbits, Lawanda Pau FNP Unavailable         Unavailable

 

                    Tibbits, Lawanda Pau FNP Unavailable         Unavailable

 

                    Tibbits, Lawanda Pau FNP Unavailable         Unavailable

 

                    Tibbits, Lawanda Pau FNP Unavailable         Unavailable

 

                    Tibbits, Lawanda Pau FNP Unavailable         Unavailable

 

                    Tibbits, Lawanda Pau FNP Unavailable         Unavailable

 

                    Tibbits, Lawanda Pau FNP Unavailable         Unavailable

 

                    Tibbits, Lawanda Pau FNP Unavailable         Unavailable

 

                    Tibbits, Lawanda Pau FNP Unavailable         Unavailable

 

                    Tibbits, Lawanda Pau FNP Unavailable         Unavailable

 

                    Tibbits, Lawanda Pau FNP Unavailable         Unavailable

 

                    Tibbits, Lawanda Pau FNP Unavailable         Unavailable

 

                    Tibbits, Lawanda Pau FNP Unavailable         Unavailable

 

                    Tibbits, Lawanda Pau FNP Unavailable         Unavailable

 

                    KHAIRALLAH,  RAMZI MD Unavailable         Unavailable

 

                    KHAIRALLAH,  RAMZI MD Unavailable         Unavailable

 

                    KHAIRALLAH,  RAMZI MD Unavailable         Unavailable

 

                    KHAIRALLAH,  RAMZI MD Unavailable         Unavailable

 

                    KHAIRALLAH,  RAMZI MD Unavailable         Unavailable

 

                    KHAIRALLAH,  RAMZI MD Unavailable         Unavailable

 

                    KHAIRALLAH,  RAMZI MD Unavailable         Unavailable

 

                    KHAIRALLAH,  RAMZI MD Unavailable         Unavailable

 

                    KHAIRALLAH,  RAMZI MD Unavailable         Unavailable

 

                    KHAIRALLAH,  RAMZI MD Unavailable         Unavailable

 

                    KHAIRALLAH,  RAMZI MD Unavailable         Unavailable

 

                    KHAIRALLAH,  RAMZI MD Unavailable         Unavailable

 

                    KHAIRALLAH,  RAMZI MD Unavailable         Unavailable

 

                    KHAIRALLAH,  RAMZI MD Unavailable         Unavailable

 

                    KHAIRALLAH,  RAMZI MD Unavailable         Unavailable

 

                    KHAIRALLAH,  RAMZI MD Unavailable         Unavailable

 

                    KHAIRALLAH,  RAMZI MD Unavailable         Unavailable

 

                    KHAIRALLAH,  RAMZI MD Unavailable         Unavailable

 

                    KHAIRALLAH,  RAMZI MD Unavailable         Unavailable

 

                    KHAIRALLAH,  RAMZI MD Unavailable         Unavailable

 

                    KHAIRALLAH,  RAMZI MD Unavailable         Unavailable

 

                    KHAIRALLAH,  RAMZI MD Unavailable         Unavailable

 

                    KHAIRALLAH,  RAMZI MD Unavailable         Unavailable

 

                    KHAIRALLAH,  RAMZI MD Unavailable         Unavailable

 

                    KHAIRALLAH,  RAMZI MD Unavailable         Unavailable

 

                    KHAIRALLAH,  RAMZI MD Unavailable         Unavailable

 

                    KHAIRALLAH,  RAMZI MD Unavailable         Unavailable

 

                    KHAIRALLAH,  RAMZI MD Unavailable         Unavailable

 

                    KHAIRALLAH,  RAMZI MD Unavailable         Unavailable

 

                    KHAIRALLAH,  RAMZI MD Unavailable         Unavailable

 

                    KHAIRALLAH,  RAMZI MD Unavailable         Unavailable

 

                    KHAIRALLAH,  RAMZI MD Unavailable         Unavailable

 

                    KHAIRALLAH,  RAMZI MD Unavailable         Unavailable

 

                    KHAIRALLAH,  RAMZI MD Unavailable         Unavailable

 

                    KHAIRALLAH,  RAMZI MD Unavailable         Unavailable

 

                    KHAIRALLAH,  RAMZI MD Unavailable         Unavailable

 

                    KHAIRALLAH,  RAMZI MD Unavailable         Unavailable

 

                    KHAIRALLAH,  RAMZI MD Unavailable         Unavailable

 

                    KHAIRALLAH,  RAMZI MD Unavailable         Unavailable

 

                    KHAIRALLAH,  RAMZI MD Unavailable         Unavailable

 

                    KHAIRALLAH,  RAMZI MD Unavailable         Unavailable

 

                    KHAIRALLAH,  RAMZI MD Unavailable         Unavailable

 

                    KHAIRALLAH,  RAMZI MD Unavailable         Unavailable

 

                    KHAIRALLAH,  RAMZI MD Unavailable         Unavailable

 

                    KHAIRALLAH,  RAMZI MD Unavailable         Unavailable

 

                    KHAIRALLAH,  RAMZI MD Unavailable         Unavailable

 

                    KHAIRALLAH,  RAMZI MD Unavailable         Unavailable

 

                    KHAIRALLAH,  RAMZI MD Unavailable         Unavailable

 

                    KHAIRALLAH,  RAMZI MD Unavailable         Unavailable

 

                    KHAIRALLAH,  RAMZI MD Unavailable         Unavailable

 

                    KHAIRALLAH,  RAMZI MD Unavailable         Unavailable

 

                    KHAIRALLAH,  RAMZI MD Unavailable         Unavailable

 

                    KHAIRALLAH,  RAMZI MD Unavailable         Unavailable

 

                    KHAIRALLAH,  RAMZI MD Unavailable         Unavailable

 

                    KHAIRALLAH,  RAMZI MD Unavailable         Unavailable

 

                    KHAIRALLAH,  RAMZI MD Unavailable         Unavailable

 

                    KHAIRALLAH,  RAMZI MD Unavailable         Unavailable

 

                    KHAIRALLAH,  RAMZI MD Unavailable         Unavailable

 

                    KHAIRALLAH,  RAMZI MD Unavailable         Unavailable

 

                    KHAIRALLAH,  RAMZI MD Unavailable         Unavailable

 

                    KHAIRALLAH,  RAMZI MD Unavailable         Unavailable

 

                    KHAIRALLAH,  RAMZI MD Unavailable         Unavailable

 

                    KHAIRALLAH,  RAMZI MD Unavailable         Unavailable

 

                    KHAIRALLAH,  RAMZI MD Unavailable         Unavailable

 

                    KHAIRALLAH,  RAMZI MD Unavailable         Unavailable

 

                    KHAIRALLAH,  RAMZI MD Unavailable         Unavailable

 

                    KHAIRALLAH,  RAMZI MD Unavailable         Unavailable

 

                    KHAIRALLAH,  RAMZI MD Unavailable         Unavailable

 

                    KHAIRALLAH,  RAMZI MD Unavailable         Unavailable

 

                    KHAIRALLAH,  RAMZI MD Unavailable         Unavailable

 

                    KHAIRALLAH,  RAMZI MD Unavailable         Unavailable

 

                    KHAIRALLAH,  RAMZI MD Unavailable         Unavailable

 

                    KHAIRALLAH,  RAMZI MD Unavailable         Unavailable

 

                    KHAIRALLAH,  RAMZI MD Unavailable         Unavailable

 

                    KHAIRALLAH,  RAMZI MD Unavailable         Unavailable

 

                    KHAIRALLAH,  RAMZI MD Unavailable         Unavailable

 

                    KHAIRALLAH,  RAMZI MD Unavailable         Unavailable

 

                    KHAIRALLAH,  RAMZI MD Unavailable         Unavailable

 

                    KHAIRALLAH,  RAMZI MD Unavailable         Unavailable

 

                    KHAIRALLAH,  RAMZI MD Unavailable         Unavailable

 

                    KHAIRALLAH,  RAMZI MD Unavailable         Unavailable

 

                    KHAIRALLAH,  RAMZI MD Unavailable         Unavailable

 

                    KHAIRALLAH,  RAMZI MD Unavailable         Unavailable

 

                    KHAIRALLAH,  RAMZI MD Unavailable         Unavailable

 

                    KHAIRALLAH,  RAMZI MD Unavailable         Unavailable

 

                    KHAIRALLAH,  RAMZI MD Unavailable         Unavailable

 

                    KHAIRALLAH,  RAMZI MD Unavailable         Unavailable

 

                    KHAIRALLAH,  RAMZI MD Unavailable         Unavailable

 

                    Liana Gleason MD   Unavailable         Unavailable

 

                    Liana Gleason MD   Unavailable         Unavailable

 

                    Liana Gleason MD   Unavailable         Unavailable

 

                    Liana Gleason MD   Unavailable         Unavailable

 

                    Liana Gleason MD   Unavailable         Unavailable

 

                    Liana Gleason MD   Unavailable         Unavailable

 

                    Liana Gleason MD   Unavailable         Unavailable

 

                    Liana Gleason MD   Unavailable         Unavailable

 

                    Liana Gleason MD   Unavailable         Unavailable

 

                    Liana Gleason MD   Unavailable         Unavailable

 

                    Liana Gleason MD   Unavailable         Unavailable

 

                    Liana Gleason MD   Unavailable         Unavailable

 

                    Liana Gleason MD   Unavailable         Unavailable

 

                    Liana Gleason MD   Unavailable         Unavailable

 

                    Liana Gleason MD   Unavailable         Unavailable

 

                    Abdulky,  Liana MD   Unavailable         Unavailable

 

                    Abdulky,  Liana MD   Unavailable         Unavailable

 

                    Abdulky,  Liana MD   Unavailable         Unavailable

 

                    Abdulky,  Liana MD   Unavailable         Unavailable

 

                    Abdulky,  Liana MD   Unavailable         Unavailable

 

                    Abdulky,  Liana MD   Unavailable         Unavailable

 

                    Abdulky,  Liana MD   Unavailable         Unavailable

 

                    Abdulky,  Liana MD   Unavailable         Unavailable

 

                    Abdulky,  Liana MD   Unavailable         Unavailable

 

                    Abdulky,  Liana MD   Unavailable         Unavailable

 

                    Abdulky,  Liana MD   Unavailable         Unavailable

 

                    Abdulky,  Liana MD   Unavailable         Unavailable

 

                    Abdulky,  Liana MD   Unavailable         Unavailable

 

                    Abdulky,  Liana MD   Unavailable         Unavailable

 

                    Abdulky,  Liana MD   Unavailable         Unavailable

 

                    Abdulky,  Liana MD   Unavailable         Unavailable

 

                    Abdulky,  Liana MD   Unavailable         Unavailable

 

                    Abdulky,  Liana MD   Unavailable         Unavailable

 

                    Abdulky,  Liana MD   Unavailable         Unavailable

 

                    Abdulky,  Liana MD   Unavailable         Unavailable

 

                    Abdulky,  Liana MD   Unavailable         Unavailable

 

                    Abdulky,  Liana MD   Unavailable         Unavailable

 

                    Abdulky,  Liana MD   Unavailable         Unavailable

 

                    Abdulky,  Liana MD   Unavailable         Unavailable

 

                    Abdulky,  Liana MD   Unavailable         Unavailable

 

                    Abdulky,  Liana MD   Unavailable         Unavailable

 

                    Abdulky,  Liana MD   Unavailable         Unavailable

 

                    Abdulky,  Liana MD   Unavailable         Unavailable

 

                    Abdulky,  Liana MD   Unavailable         Unavailable

 

                    Abdulky,  Liana MD   Unavailable         Unavailable

 

                    Abdulky,  Liana MD   Unavailable         Unavailable

 

                    Abdulky,  Liana MD   Unavailable         Unavailable

 

                    Abdulky,  Liana MD   Unavailable         Unavailable

 

                    Abdulky,  Liana MD   Unavailable         Unavailable

 

                    Abdulky,  Liana MD   Unavailable         Unavailable

 

                    Abdulky,  Liana MD   Unavailable         Unavailable

 

                    Abdulky,  Liana MD   Unavailable         Unavailable

 

                    Abdulky,  Liana MD   Unavailable         Unavailable

 

                    Abdulky,  Liana MD   Unavailable         Unavailable

 

                    Abdulky,  Liana MD   Unavailable         Unavailable

 

                    Abdulky,  Liana MD   Unavailable         Unavailable

 

                    Abdulky,  Liana MD   Unavailable         Unavailable

 

                    Abdulky,  Liana MD   Unavailable         Unavailable

 

                    Abdulky,  Liana MD   Unavailable         Unavailable

 

                    Abdulky,  Liana MD   Unavailable         Unavailable

 

                    Abdulky,  Liana MD   Unavailable         Unavailable

 

                    Abdulky,  Liana MD   Unavailable         Unavailable

 

                    Abdulky,  Liana MD   Unavailable         Unavailable

 

                    Abdulky,  Liana MD   Unavailable         Unavailable

 

                    Abdulky,  Liana MD   Unavailable         Unavailable

 

                    Abdulky,  Liana MD   Unavailable         Unavailable

 

                    Abdulky,  Liana MD   Unavailable         Unavailable

 

                    Abdulky,  Liana MD   Unavailable         Unavailable

 

                    Abdulky,  Liana MD   Unavailable         Unavailable

 

                    Abdulky,  Liana MD   Unavailable         Unavailable

 

                    Abdulky,  Liana MD   Unavailable         Unavailable

 

                    Abdulky,  Liana MD   Unavailable         Unavailable

 

                    Abdulky,  Liana MD   Unavailable         Unavailable

 

                    Abdulky,  Liana MD   Unavailable         Unavailable

 

                    Abdulky,  Liana MD   Unavailable         Unavailable

 

                    Abdulky,  Liana MD   Unavailable         Unavailable

 

                    Abdulky,  Liana MD   Unavailable         Unavailable

 

                    Abdulky,  Liana MD   Unavailable         Unavailable

 

                    Abdulky,  Liana MD   Unavailable         Unavailable

 

                    Abdulky,  Liana MD   Unavailable         Unavailable

 

                    Abdulky,  Liana MD   Unavailable         Unavailable

 

                    Abdulky,  Liana MD   Unavailable         Unavailable

 

                    Abdulky,  Liana MD   Unavailable         Unavailable

 

                    Abdulky,  Liana MD   Unavailable         Unavailable

 

                    Abdulky,  Liana MD   Unavailable         Unavailable

 

                    Abdulky,  Liana MD   Unavailable         Unavailable

 

                    Abdulky,  Liana MD   Unavailable         Unavailable

 

                    Abdulky,  Liana MD   Unavailable         Unavailable

 

                    Abdulky,  Liana MD   Unavailable         Unavailable

 

                    Abdulky,  Liana MD   Unavailable         Unavailable

 

                    Abdulky,  Liana MD   Unavailable         Unavailable

 

                    Abdulky,  Liana MD   Unavailable         Unavailable

 

                    Abdulky,  Liana MD   Unavailable         Unavailable

 

                    Abdulky,  Liana MD   Unavailable         Unavailable

 

                    Abdulky,  Liana MD   Unavailable         Unavailable

 

                    Abdulky,  Liana MD   Unavailable         Unavailable

 

                    Abdulky,  Liana MD   Unavailable         Unavailable

 

                    Abdulky,  Liana MD   Unavailable         Unavailable

 

                    Abdulky,  Liana MD   Unavailable         Unavailable

 

                    Abdulky,  Liana MD   Unavailable         Unavailable

 

                    Abdulky,  Liana MD   Unavailable         Unavailable

 

                    Abdulky,  Liana MD   Unavailable         Unavailable

 

                    Abdulky,  Liana MD   Unavailable         Unavailable

 

                    Abdulky,  Liana MD   Unavailable         Unavailable

 

                    Abdulky,  Liana MD   Unavailable         Unavailable

 

                    Abdulky,  Liana MD   Unavailable         Unavailable



                                  



Re-disclosure Warning

          The records that you are about to access may contain information from 
federally-assisted alcohol or drug abuse programs. If such information is 
present, then the following federally mandated warning applies: This information
has been disclosed to you from records protected by federal confidentiality 
rules (42 CFR part 2). The federal rules prohibit you from making any further 
disclosure of this information unless further disclosure is expressly permitted 
by the written consent of the person to whom it pertains or as otherwise 
permitted by 42 CFR part 2. A general authorization for the release of medical 
or other information is NOT sufficient for this purpose. The Federal rules 
restrict any use of the information to criminally investigate or prosecute any 
alcohol or drug abuse patient.The records that you are about to access may 
contain highly sensitive health information, the redisclosure of which is 
protected by Article 27-F of the [facility] Public [facility] law. If you 
continue you may have access to information: Regarding HIV / AIDS; Provided by 
facilities licensed or operated by the [facility] of Mental Health; 
or Provided by the [facility] for People With Developmental 
Disabilities. If such information is present, then the following [place] 
mandated warning applies: This information has been disclosed to you from 
confidential records which are protected by state law. State law prohibits you 
from making any further disclosure of this information without the specific 
written consent of the person to whom it pertains, or as otherwise permitted by 
law. Any unauthorized further disclosure in violation of state law may result in
a fine or intermediate sentence or both. A general authorization for the release of 
medical or other information is NOT sufficient authorization for further disc
losure.                                                                         
    



Family History

          



             Family Member Name Family Member Gender Family Member Status Date o

f Status 

Description                             Data Source(s)

 

           Unknown    Unknown    Problem                          [facility] ([facility] )

 

           Unknown    Male       Problem (finding) 2017 12:00:00 AM EDT   

         [facility] ([facility])

 

           Unknown    Male       Problem (finding) 2017 12:00:00 AM EDT   

         [facility] ([facility])

 

           Unknown    Male       Problem (finding) 2017 12:00:00 AM EDT   

         [facility] ([facility])

 

           Unknown    Male       Problem (finding) 2017 12:00:00 AM EDT   

         [facility] ([facility])



                                                                                
                 



Encounters

          



           Encounter  Providers  Location   Date       Indications Data Source(s

)

 

                Outpatient      Attender: ADRIANE MARTELL MD [facility] 2021 01:00:00 PM 

EDT                                                 [facility] ([facility]

 

                                Attender: Liana Gleason MD [facility]

[facility] 2021 10:00:00

AM EDT - 2021 10:00:00 AM EDT     Rheumatoid arthritis with rheumatoid fac

tor 

of multiple sites without organ or systems involvement [facility] ([facility])

 

                                        Rheumatoid arthritis with rheumatoid fac

tor of multiple sites without organ or 

systems involvement 

 

                                Attender: Tressa PEREZ [facility] 2021 

12:17:00 PM EDT - 2021 12:17:00 PM EDT                           [facility] (

[facility])

 

                                Attender: Pau MARTINEZ [facility] 2021 

11:34:00 AM EDT - 2021 11:34:00 AM EDT                           [facility] (

[facility])

 

                                Attender: Liana Gleason MD [facility]

[facility] 2021 11:15:00

AM EDT - 2021 11:15:00 AM EDT                           [facility] ([facility])

 

                                Attender: Tressa PEREZ [facility] 2021 

11:15:00 AM EDT - 2021 11:15:00 AM EDT                           [facility] (

[facility])

 

                                Attender: ANGELIA CONNER MD [facility]

[facility] 2021 

01:20:00 PM EDT - 2021 01:20:00 PM EDT Rheumatoid arthritis with 

rheumatoid factor of multiple sites without organ or systems involvement [facility]

([facility])

 

                                        Rheumatoid arthritis with rheumatoid fac

tor of multiple sites without organ or 

systems involvement 

 

                                Attender: Tressa [facility]EREZ [facility]

[facility] 2021 

09:03:00 AM EDT - 2021 09:03:00 AM EDT                           [facility]

[facility]

 

                    OFFICE/OUTPATIENT VISIT, ESTOutpatient Attender: Tressa PEREZ 

[facility]        2021 01:20:00 PM EDT - 2021 

01:20:00 PM EDT                         Other long term (current) drug therapyTr

emor, 

unspecifiedRheumatoid arthritis with rheumatoid factor of multiple sites without
organ or systems involvement            [facility] ([facility])

 

                                        Other long term (current) drug therapy 

 

                                        Tremor, unspecified 

 

                                        Rheumatoid arthritis with rheumatoid fac

tor of multiple sites without organ or 

systems involvement 

 

                                Attender: Liana Gleason MD [facility]

[facility] 2021 12:40:00

PM EDT - 2021 12:40:00 PM EDT     Rheumatoid arthritis with rheumatoid fac

tor 

of multiple sites without organ or systems involvement [facility] ([facility])

 

                                        Rheumatoid arthritis with rheumatoid fac

tor of multiple sites without organ or 

systems involvement 

 

                                Attender: MAREK PEREZ [facility]

[facility] 2021 

03:09:00 PM EDT - 2021 03:09:00 PM EDT                           [facility] (

[facility])

 

                                Attender: Liana Gleason MD [facility]

[facility] 03/10/2021 01:08:00

PM EST - 03/10/2021 01:08:00 PM EST                           [facility] ([facility])

 

                                Attender: ANGELIA CONNER MD [facility]

[facility] 03/10/2021 

11:20:00 AM EST - 03/10/2021 11:20:00 AM EST Rheumatoid arthritis with 

rheumatoid factor of multiple sites without organ or systems involvement [facility]

([facility])

 

                                        Rheumatoid arthritis with rheumatoid fac

tor of multiple sites without organ or 

systems involvement 

 

                                Attender: Liana Gleason MD [facility]

[facility] 2021 03:02:00

PM EST - 2021 03:02:00 PM EST                           [facility] ([facility])

 

                                Attender: [facility] Gleason [facility]

AM EST - 2021 10:33:00 AM EST                           [facility] ([facility])

 

                                Attender: Pau ALVAREZ [facility] 2021 

10:33:00 AM EST - 2021 10:33:00 AM EST                           [facility] (

[facility])

 

                                Attender: Liana Gleason MD [facility] 10:[facility]00

AM EST - 2021 10:40:00 AM EST     Rheumatoid arthritis with rheumatoid fac

tor 

of multiple sites without organ or systems involvement [facility] ([facility])

 

                                        Rheumatoid arthritis with rheumatoid fac

tor of multiple sites without organ or 

systems involvement 

 

                                Attender: MAREK PEREZ [facility]

[facility] 2021 

02:27:00 PM EST - 2021 02:27:00 PM EST                           [facility] (

[facility])

 

                Outpatient      Attender: ADRIANE MARTELL MD [place] [facility]  12:00:00 PM 

EST                                                 [facility]

 

                    OFFICE/OUTPATIENT VISIT, ESTOutpatient Attender: MAREK PEREZ [facility]    2021 11:16:00 AM EST - 2021 11:16:00 AM ES

T 

Other long term (current) drug therapyRheumatoid arthritis with rheumatoid 
factor of multiple sites without organ or systems involvement [facility] ([facility]

 

                                        Other long term (current) drug therapy 

 

                                        Rheumatoid arthritis with rheumatoid fac

tor of multiple sites without organ or 

systems involvement 

 

                                Attender: Liana Gleason MD [facility] 11[facility]

AM EST - 2021 11:40:00 AM EST     Rheumatoid arthritis with rheumatoid fac

tor 

of multiple sites without organ or systems involvement [facility] ([facility])

 

                                        Rheumatoid arthritis with rheumatoid fac

tor of multiple sites without organ or 

systems involvement 

 

                                Attender: Pau Funk [facility] 2021 

10:38:00 AM EST - 2021 10:38:00 AM EST                           [facility] (

[facility])

 

                                Attender: Pau Funk [facility] 2020 

09:31:00 AM EST - 2020 09:31:00 AM EST                           [facility] (

[facility])

 

                                Attender: Liana Gleason MD [facility]

[facility] 10/29/2020 12:00:00

PM EDT - 10/29/2020 12:00:00 PM EDT     Rheumatoid arthritis with rheumatoid fac

tor 

of multiple sites without organ or systems involvement [facility] ([facility]

 

                                        Rheumatoid arthritis with rheumatoid fac

tor of multiple sites without organ or 

systems involvement 

 

                                Attender: Pau Funk [facility] 10/13/2020 

11:40:00 AM EDT - 10/13/2020 11:40:00 AM EDT Tremor, unspecifiedRheumatoid 

arthritis with rheumatoid factor of multiple sites without organ or systems 
involvementOther long term (current) drug therapyDorsalgia, unspecified [facility] 

([facility])

 

                                        Tremor, unspecified 

 

                                        Rheumatoid arthritis with rheumatoid fac

tor of multiple sites without organ or 

systems involvement 

 

                                        Other long term (current) drug therapy 

 

                                        Dorsalgia, unspecified 

 

                                Attender: ANGELIA CONNER MD [facility]

[facility] 2020 

01:00:00 PM EDT - 2020 01:00:00 PM EDT Rheu arthritis w rheu factor mult 

site w/o org/sys involv                 [facility] ([facility])

 

                                        Rheu arthritis w rheu factor mult site w

/o org/sys involv 



                                                                                
                                                                                
                                                                                
                                                                                
              



Immunizations

          



             Vaccine      Date         Status       Description  Data Source(s)

 

                Covid 19 Moderna Vaccine 2021 12:00:00 AM EDT completed   

    Covid 19 Moderna 

Vaccine                                 [facility] ([facility])

 

                                        Source: Other Provider 

 

             COVID-19 VACCINE Moderna 2021 12:00:00 AM EDT completed      

           [facility]

 

                                        Vaccine Series Complete: YESThis Data wa

s Submitted to [facility] [employer]. 

 

                Covid 19 Moderna Vaccine 2021 12:00:00 AM EST completed   

    Covid 19 Moderna 

Vaccine                                 [facility] ([facility])

 

                                        Source: Other Provider 

 

             COVID-19 VACCINE Moderna 2021 12:00:00 AM EST completed      

           [facility]

 

                                        Vaccine Series Complete: NOThis Data was

 Submitted to [facility] [employer]. 

 

             pneumococcal polysaccharide PPV23 2021 11:56:00 AM EST comple

lucie ALDRICH 

([facility]

 

                          Influenza, injectable, MDCK, preservative free, brittanie

valent 2020 12:00:00

AM EST              completed           Flucelvax [id number] [facility] ([facility])

 

                                        Note: approx ; Source: Other Provider 



                                                                                
                                                         



Medications

          



          Medication Brand Name Start Date Product Form Dose      Route     Admi

nistrative 

Instructions Pharmacy Instructions Status     Indications Reaction   Description

 Data 

Source(s)

 

          5 mg                2021 12:00:00 AM EDT tablet    30           

       TAKE ONE TABLET BY MOUTH EVERY DAY

           TAKE ONE TABLET BY MOUTH EVERY DAY SOLD: 2021                  

                [facility]

 

          5 mg                2021 12:00:00 AM EDT tablet    30           

       TAKE ONE TABLET BY MOUTH EVERY DAY

           TAKE ONE TABLET BY MOUTH EVERY DAY SOLD: 2021                  

                [facility]

 

          5 mg                2021 12:00:00 AM EDT tablet    30           

       TAKE ONE TABLET BY MOUTH EVERY DAY

           TAKE ONE TABLET BY MOUTH EVERY DAY SOLD: 10/07/2021                  

                [facility]

 

          5 mg                2021 12:00:00 AM EDT tablet    30           

       TAKE ONE TABLET BY MOUTH EVERY DAY

           TAKE ONE TABLET BY MOUTH EVERY DAY SOLD: 2021                  

                [facility]

 

          50 mg               2021 12:00:00 AM EDT tablet    90           

       TAKE ONE TABLET BY MOUTH EVERY 

DAY        TAKE ONE TABLET BY MOUTH EVERY DAY SOLD: 2021                  

                [facility]

 

          50 mg               2021 12:00:00 AM EDT tablet    90           

       TAKE ONE TABLET BY MOUTH EVERY 

DAY        TAKE ONE TABLET BY MOUTH EVERY DAY SOLD: 2021                  

                [facility]

 

                          Sulfasalazine 500 MG Oral Tablet sulfasalazine 500 mg 

tablet sulfasalazine 500 

mg tablet 2021 12:00:00 AM EDT        2 {tbl} ORAL                 active 

              take 2 Tablet

by oral route 2 times every day after meals [facility]

 

                                        Hydroxychloroquine Sulfate 200 MG Oral T

ablet HYDROXYCHLOROQUINE  200MG TABLET  

TB           HYDROXYCHLOROQUINE  200MG TABLET  TB 2021 12:00:00 AM EDT    

          1 {tbl}      

ORAL                          active                        TAKE 1 TABLET BY VIGNESH

TH  TWICE DAILY [facility]

 

                    Folic Acid 1 MG Oral Tablet folic acid 1 mg tablet folic aci

d 1 mg tablet 

2021 12:00:00 AM EDT         1 {tbl} ORAL                    active       

           take 1 Tablet by oral 

route  every day                        [facility]

 

          50 mg               2021 12:00:00 AM EDT tablet    90           

       TAKE ONE TABLET BY MOUTH THREE 

TIMES A DAY AS NEEDED FOR PAIN MAXIMUM DAILY DOSE = 3 TABLETS TAKE ONE TABLET BY

MOUTH THREE TIMES A DAY AS NEEDED FOR PAIN MAXIMUM DAILY DOSE = 3 TABLETS SOLD: 

2021                                                      [facility]

 

          50 mg               2021 12:00:00 AM EDT tablet    90           

       TAKE ONE TABLET BY MOUTH THREE 

TIMES A DAY AS NEEDED FOR PAIN MAXIMUM DAILY DOSE = 3 TABLETS TAKE ONE TABLET BY

MOUTH THREE TIMES A DAY AS NEEDED FOR PAIN MAXIMUM DAILY DOSE = 3 TABLETS SOLD: 

2021                                                      [facility]

 

          50 mg               2021 12:00:00 AM EDT tablet    90           

       TAKE ONE TABLET BY MOUTH THREE 

TIMES A DAY AS NEEDED FOR PAIN MAXIMUM DAILY DOSE = 3 TABLETS TAKE ONE TABLET BY

MOUTH THREE TIMES A DAY AS NEEDED FOR PAIN MAXIMUM DAILY DOSE = 3 TABLETS SOLD: 

2021                                                      [facility]

 

          50 mg               2021 12:00:00 AM EDT tablet    90           

       TAKE ONE TABLET BY MOUTH THREE 

TIMES A DAY AS NEEDED FOR PAIN MAXIMUM DAILY DOSE = 3 TABLETS TAKE ONE TABLET BY

MOUTH THREE TIMES A DAY AS NEEDED FOR PAIN MAXIMUM DAILY DOSE = 3 TABLETS SOLD: 

10/07/2021                                                      [facility]

 

          50 mg               2021 12:00:00 AM EDT tablet    90           

       TAKE ONE TABLET BY MOUTH THREE 

TIMES A DAY AS NEEDED FOR PAIN MAXIMUM DAILY DOSE = 3 TABLETS TAKE ONE TABLET BY

MOUTH THREE TIMES A DAY AS NEEDED FOR PAIN MAXIMUM DAILY DOSE = 3 TABLETS SOLD: 

2021                                                      [facility]

 

                    leflunomide 20 MG Oral Tablet leflunomide 20 mg tablet leflu

nomide 20 mg tablet 

2021 12:00:00 AM EDT                                    active            

   TAKE 1 TABLET BY MOUTH  DAILY 

[facility] ([facility]

 

                    Folic Acid 1 MG Oral Tablet FOLIC ACID TAB 1MG FOLIC ACID TA

B 1MG  03/10/2021 

12:00:00 AM EST        1 {tbl} ORAL                 completed               TAKE

 1 TABLET BY MOUTH  DAILY 

[facility] ([facility])

 

                    leflunomide 20 MG Oral Tablet LEFLUNOMIDE  20MG  TAB LEFLUNO

MIDE  20MG  TAB 

03/10/2021 12:00:00 AM EST                                         completed    

             TAKE 1 TABLET BY MOUTH  DAILY

                                        [facility] ([facility])

 

                          Sulfasalazine 500 MG Oral Tablet SULFASALAZINE  500MG 

 TAB SULFASALAZINE  500MG 

TAB    03/10/2021 12:00:00 AM EST        2 {tbl} ORAL                 completed 

              TAKE 2 TABLETS 

BY MOUTH  TWICE DAILY AFTER MEALS       [facility] ([facility]

 

          50 mg               2021 12:00:00 AM EST tablet    90           

       TAKE ONE TABLET BY MOUTH THREE 

TIMES A DAY AS NEEDED FOR PAIN . MAXIMUM DAILY DOSE = 3 TAKE ONE TABLET BY MOUTH

THREE TIMES A DAY AS NEEDED FOR PAIN . MAXIMUM DAILY DOSE = 3 SOLD: 2021  

         

                                                            [facility]

 

          5 mg                2021 12:00:00 AM EST tablet    30           

       TAKE ONE TABLET BY MOUTH EVERY DAY

           TAKE ONE TABLET BY MOUTH EVERY DAY SOLD: 2021                  

                [facility]

 

          5 mg                2021 12:00:00 AM EST tablet    30           

       TAKE ONE TABLET BY MOUTH EVERY DAY

           TAKE ONE TABLET BY MOUTH EVERY DAY SOLD: 2021                  

                [facility]

 

          5 mg                2021 12:00:00 AM EST tablet    30           

       TAKE ONE TABLET BY MOUTH EVERY DAY

           TAKE ONE TABLET BY MOUTH EVERY DAY SOLD: 2021                  

                [facility]

 

          5 mg                2021 12:00:00 AM EST tablet    30           

       TAKE ONE TABLET BY MOUTH EVERY DAY

           TAKE ONE TABLET BY MOUTH EVERY DAY SOLD: 2021                  

                [facility]

 

                    Folic Acid 1 MG Oral Tablet folic acid 1 mg tablet folic aci

d 1 mg tablet 

2021 12:00:00 AM EST         1 {tbl} ORAL                    completed    

             take 1 Tablet by 

oral route  every day                   [facility] ([facility]

 

                          Sulfasalazine 500 MG Oral Tablet sulfasalazine 500 mg 

tablet sulfasalazine 500 

mg tablet 2021 12:00:00 AM EST        2 {tbl} ORAL                 complet

ed               take 2 

Tablet by oral route 2 times every day after meals [facility] ([facility]

 

                    leflunomide 20 MG Oral Tablet leflunomide 20 mg tablet leflu

nomide 20 mg tablet 

2021 12:00:00 AM EST                                         completed    

             TAKE 1 TABLET BY MOUTH  EVERY

DAY                                     [facility]

 

          50 mg               2020 12:00:00 AM EST tablet    90           

       TAKE ONE TABLET BY MOUTH EVERY 

DAY        TAKE ONE TABLET BY MOUTH EVERY DAY SOLD: 2021                  

                [facility]

 

          50 mg               2020 12:00:00 AM EST tablet    90           

       TAKE ONE TABLET BY MOUTH EVERY 

DAY        TAKE ONE TABLET BY MOUTH EVERY DAY SOLD: 2020                  

                [facility]

 

        Sertraline 50 MG Oral Tablet Sertraline HCL 2020 12:00:00 AM EST  

                                

             active                                              [facility]

 

          500 mg              10/14/2020 12:00:00 AM EDT tablet    360          

       TAKE TWO TABLETS BY MOUTH TWICE

A DAY AFTER MEALS         TAKE TWO TABLETS BY MOUTH TWICE A DAY AFTER MEALS SOLD

: 

10/16/2020                                                      [facility]

 

                    Folic Acid 1 MG Oral Tablet folic acid 1 mg tablet folic aci

d 1 mg tablet 

10/13/2020 12:00:00 AM EDT         1 {tbl} ORAL                    completed    

             take 1 Tablet by 

oral route  every day                   [facility] ([facility]

 

                    leflunomide 20 MG Oral Tablet leflunomide 20 mg tablet leflu

nomide 20 mg tablet 

10/13/2020 12:00:00 AM EDT                                         completed    

             TAKE 1 TABLET BY MOUTH  EVERY

DAY                                     [facility] ([facility]

 

                          Hydroxychloroquine Sulfate 200 MG Oral Tablet hydroxyc

hloroquine 200 mg tablet 

hydroxychloroquine 200 mg tablet 10/13/2020 12:00:00 AM EDT           1 {tbl}   

ORAL                          

completed                                       take 1 Tablet by oral route 2 ti

mes every day [facility] ([facility]

 

                          Sulfasalazine 500 MG Oral Tablet sulfasalazine 500 mg 

tablet sulfasalazine 500 

mg tablet 10/13/2020 12:00:00 AM EDT        2 {tbl} ORAL                 complet

ed               take 2 

Tablet by oral route 2 times every day after meals [facility]

 

          5 mg                2020 12:00:00 AM EDT tablet    30           

       TAKE ONE TABLET BY MOUTH EVERY DAY

           TAKE ONE TABLET BY MOUTH EVERY DAY SOLD: 2020                  

                [facility]

 

          5 mg                2020 12:00:00 AM EDT tablet    30           

       TAKE ONE TABLET BY MOUTH EVERY DAY

           TAKE ONE TABLET BY MOUTH EVERY DAY SOLD: 2021                  

                [facility]

 

          5 mg                2020 12:00:00 AM EDT tablet    30           

       TAKE ONE TABLET BY MOUTH EVERY DAY

           TAKE ONE TABLET BY MOUTH EVERY DAY SOLD: 2020                  

                [facility]

 

          5 mg                2020 12:00:00 AM EDT tablet    30           

       TAKE ONE TABLET BY MOUTH EVERY DAY

           TAKE ONE TABLET BY MOUTH EVERY DAY SOLD: 10/02/2020                  

                [facility]

 

          50 mg               2020 12:00:00 AM EDT tablet    90           

       TAKE 1 TAB.BY MOUTH 3 TIMES A DAY

AS NEEDED FOR PAIN MAX DAILY DOSE=3 TABLETS TAKE 1 TAB.BY MOUTH 3 TIMES A DAY AS

NEEDED FOR PAIN MAX DAILY DOSE=3 TABLETS SOLD: 10/05/2020                       

                 [facility]

 

          50 mg               2020 12:00:00 AM EDT tablet    90           

       TAKE 1 TAB.BY MOUTH 3 TIMES A DAY

AS NEEDED FOR PAIN MAX DAILY DOSE=3 TABLETS TAKE 1 TAB.BY MOUTH 3 TIMES A DAY AS

NEEDED FOR PAIN MAX DAILY DOSE=3 TABLETS SOLD: 2020                       

                 [facility]

 

                          Dexamethasone 4 MG Oral Tablet dexamethasone 4 mg tabl

et dexamethasone 4 mg 

tablet                                           completed               take 2.

5 tablet by oral route 3 times every day

                                        [facility] ([facility])



                                                                                
                                                                                
                                                                                
                                                                                
                                                                                
                                                               



Insurance Providers

          



             Payer name   Policy type / Coverage type Policy ID    Covered party

 ID Covered 

party's relationship to cortés Policy Cortés             Plan Information

 

          MEDICARE COMPLETE           [id number]                             94

[id number]

 

          MEDICARE COMPLETE           [id number]                             94

[id number]

 

          [employer] UNITED MEDICARE COMPLETE G         [id number]           Self     

           [id number]

 

          [employer] UNITED MEDICARE COMPLETE G         [id number]           Self       

         [id number]

 

                [facility] Medicare Commercial      [id number]     

[id number] Self                                    

[id number]

 

          MEDICARE COMPLETE           [id number]                             

[id number]

 

          [employer]           [id number]                             94

[id number]

 

          [employer]           [id number]                             

[id number]

 

                [employer] Medicare Commercial      [id number]     

[id number] Self                                    

[id number]

 

          MEDICARE COMPLETE           [id number]                             94

[id number]

 

                [employer] Medicare Commercial      [id number]     

[id number] Self                                    

[id number]



                                                                                
                                                                                
                          



Problems, Conditions, and Diagnoses

          No Information                                                        
                               



Surgeries/Procedures

          



             Procedure    Description  Date         Indications  Data Source(s)

 

             OFFICE OUTPATIENT VISIT 25 MINUTES              2021 12:00:00

 AM EDT              [facility]

 

                    Normal saline solution infus                     2021 

12:00:00 AM EDT - 2021 12:00:00 

AM EDT                                              [facility]

 

                    Actemra (tocilizumab 1 Mg)                     2021 12

:00:00 AM EDT - 2021 12:00:00 AM

EDT                                                 [facility]

 

                    CHEMO, IV INFUSION, 1 HR                     2021 12:0

0:00 AM EDT - 2021 12:00:00 AM 

EDT                                                 [facility]

 

                    Normal saline solution infus                     2021 

12:00:00 AM EDT - 2021 12:00:00 

AM EDT                                              [facility]

 

                    Actemra (tocilizumab 1 Mg)                     2021 12

:00:00 AM EDT - 2021 12:00:00 AM

EDT                                                 [facility]

 

                    CHEMO, IV INFUSION, 1 HR                     2021 12:0

0:00 AM EDT - 2021 12:00:00 AM 

EDT                                                 [facility]

 

                    HCV Screening For Pt Born 1945 To 1965                     0

2021 12:00:00 AM EDT - 2021

12:00:00 AM EDT                                     [facility]

 

                    OFFICE/OUTPATIENT VISIT, EST                     2021 

12:00:00 AM EDT - 2021 12:00:00 

AM EDT                                              [facility]

 

                    ASSAY OF SERUM ALBUMIN                     2021 12:00:

00 AM EDT - 2021 12:00:00 AM EDT

                                                    [facility]

 

                    ALANINE AMINO (ALT) (SGPT)                     2021 12

:00:00 AM EDT - 2021 12:00:00 AM

EDT                                                 [facility]

 

                    TRANSFERASE (AST) (SGOT)                     2021 12:0

0:00 AM EDT - 2021 12:00:00 AM 

EDT                                                 [facility]

[facility]

 

                    ASSAY OF UREA NITROGEN                     2021 12:00:

00 AM EDT - 2021 12:00:00 AM EDT

                                                    [facility]

[facility])

 

             ASSAY OF CREATININE              2021 12:00:00 AM EDT -  12:00:00 AM EDT              

[facility] ([facility]

 

             C-REACTIVE PROTEIN              2021 12:00:00 AM EDT - 2021 12:00:00 AM EDT              

[facility] ([facility])

 

                    RBC SED RATE, AUTOMATED                     2021 12:00

:00 AM EDT - 2021 12:00:00 AM 

EDT                                                 [facility]

[facility]

 

                    COMPLETE CBC W/AUTO DIFF WBC                     2021 

12:00:00 AM EDT - 2021 12:00:00 

AM EDT                                              [facility]

[facility])

 

                ROUTINE VENIPUNCTURE                 2021 12:00:00 AM EDT 

- 2021 12:00:00 AM EDT  

                                        [facility]

 

                    Normal saline solution infus                     2021 

12:00:00 AM EDT - 2021 12:00:00 

AM EDT                                              [facility]

[facility]

 

                    Actemra (tocilizumab 1 Mg)                     2021 12

:00:00 AM EDT - 2021 12:00:00 AM

EDT                                                 [facility]

[facility])

 

                    CHEMO, IV INFUSION, 1 HR                     2021 12:0

0:00 AM EDT - 2021 12:00:00 AM 

EDT                                                 [facility]

 

                    Normal saline solution infus                     03/10/2021 

12:00:00 AM EST - 03/10/2021 12:00:00 

AM EST                                              [facility]

 

                    Actemra (tocilizumab 1 Mg)                     03/10/2021 12

:00:00 AM EST - 03/10/2021 12:00:00 AM

EST                                                 [facility]

 

                    CHEMO, IV INFUSION, 1 HR                     03/10/2021 12:0

0:00 AM EST - 03/10/2021 12:00:00 AM 

EST                                                 [facility]

 

                    Normal saline solution infus                     2021 

12:00:00 AM EST - 2021 12:00:00 

AM EST                                              [facility]

 

                    Actemra (tocilizumab 1 Mg)                     2021 12

:00:00 AM EST - 2021 12:00:00 AM

EST                                                 [facility]

 

                    CHEMO, IV INFUSION, 1 HR                     2021 12:0

0:00 AM EST - 2021 12:00:00 AM 

EST                                                 [facility]

 

                    HCV Screening For Pt Born 1945 To 1965                     0

2021 12:00:00 AM EST - 2021

12:00:00 AM EST                                     [facility]

 

                    OFFICE/OUTPATIENT VISIT, EST                     2021 

12:00:00 AM EST - 2021 12:00:00 

AM EST                                              [facility]

 

                    Actemra (tocilizumab 1 Mg)                     2021 12

:00:00 AM EST - 2021 12:00:00 AM

EST                                                 [facility]

 

                    Normal saline solution infus                     2021 

12:00:00 AM EST - 2021 12:00:00 

AM EST                                              [facility]

 

                    CHEMO, IV INFUSION, 1 HR                     2021 12:0

0:00 AM EST - 2021 12:00:00 AM 

EST                                                 [facility]

 

                    ASSAY OF SERUM ALBUMIN                     2021 12:00:

00 AM EST - 2021 12:00:00 AM EST

                                                    [facility]

 

                    TRANSFERASE (AST) (SGOT)                     2021 12:0

0:00 AM EST - 2021 12:00:00 AM 

EST                                                 [facility]

 

                    ALANINE AMINO (ALT) (SGPT)                     2021 12

:00:00 AM EST - 2021 12:00:00 AM

EST                                                 [facility]

 

             ASSAY OF CREATININE              2021 12:00:00 AM EST -  12:00:00 AM EST              

[facility]

 

             C-REACTIVE PROTEIN              2021 12:00:00 AM EST - 2021 12:00:00 AM EST              

[facility] ([facility]

 

                    RBC SED RATE, AUTOMATED                     2021 12:00

:00 AM EST - 2021 12:00:00 AM 

EST                                                 [facility]

[facility]

 

                    COMPLETE CBC W/AUTO DIFF WBC                     2021 

12:00:00 AM EST - 2021 12:00:00 

AM EST                                              [facility]

[facility]

 

                ROUTINE VENIPUNCTURE                 2021 12:00:00 AM EST 

- 2021 12:00:00 AM EST  

                                        [facility]



                                                                                
                                                                                
                                                                                
                                                                                
                                                                                
                                                                         



Results

          



                    ID                  Date                Data Source

 

                    [id number] 2021 11:54:00 AM 

[facility] 

([facility])









          Name      Value     Range     Interpretation Code Description Data Priscilla

rce(s) Supporting 

Document(s)

 

                    <0.2      0.0-0.5             CRP       [facility] ([facility])  









                    ID                  Date                Data Source

 

                    [id number] 2021 11:54:00 AM 

EDT [facility] 

([facility])









          Name      Value     Range     Interpretation Code Description Data Priscilla

rce(s) Supporting 

Document(s)

 

                    1.0 mg/dL 0.9-1.2             CREATININE [facility] ([facility])  

 

                    >60                           eGFR Non- Next

Gen ([facility])  

 

                    >60                           eGFR  [facility] 

([facility])  









                    ID                  Date                Data Source

 

                    [id number] 2021 11:54:00 AM 

EDT [facility] 

([facility])









          Name      Value     Range     Interpretation Code Description Data Priscilla

rce(s) Supporting 

Document(s)

 

                    10 mg/dL  10-23               BUN       [facility]  









                    ID                  Date                Data Source

 

                    [id number] 2021 11:54:00 AM 

EDT [facility] 

([facility])









          Name      Value     Range     Interpretation Code Description Data Priscilla

rce(s) Supporting 

Document(s)

 

                    18 U/L    15-37               AST       [facility] ([facility])  









                    ID                  Date                Data Source

 

                    [id number] 2021 11:54:00 AM 

EDT [facility] 

([facility])









          Name      Value     Range     Interpretation Code Description Data Priscilla

rce(s) Supporting 

Document(s)

 

                    41 U/L    30-65               ALT       [facility] ([facility])  









                    ID                  Date                Data Source

 

                    [id number] 2021 11:54:00 AM 

EDT [facility] 

([facility])









          Name      Value     Range     Interpretation Code Description Data Priscilla

rce(s) Supporting 

Document(s)

 

                    4.1 g/dL  3.4-4.4             ALB       [facility] ([facility])  









                    ID                  Date                Data Source

 

                    [id number] 2021 11:54:00 AM 

EDT [facility] 

([facility])









          Name      Value     Range     Interpretation Code Description Data Priscilla

rce(s) Supporting 

Document(s)

 

                    2 mm/Hr   0-20                ESR       [facility] ([facility]  









                    ID                  Date                Data Source

 

                    [id number] 2021 11:54:00 AM 

EDT [facility] 

([facility])









          Name      Value     Range     Interpretation Code Description Data Priscilla

rce(s) Supporting 

Document(s)

 

                    7.9 10*3/uL 3.7-10.1            WBC       [facility])  

 

                    16.8 g/dL 13.0-18.0           HGB       [facility]  

 

                    5.30 10*6/uL 4.00-5.90           RBC       [facility]  

 

                    98.4 fL   70.0-100.0           MCV       [facility] ([facility]  

 

                    52.2 %    39.0-55.0           HCT       [facility]  

 

                    31.8 pg   26.0-34.0           MCH       [facility]  

 

                    32.3 g/dL 31.0-37.0           MCHC      [facility]  

 

                    13.3 %    10.0-15.0           RDW       [facility]  

 

                    211 10*3/uL 150-500             PLATELETS [facility] ([facility])  

 

                    3.74 10*3/uL 2.10-8.00           MICHELE#      [facility]  

 

                    >10       6.0-10.0  Above high normal MPV       [facility] ([facility]  

 

                      1.51 10*3/uL 0.10-1.00  Above high normal MONO#      [facility]

en ([facility])                              

 

                    1.63 10*3/uL 1.00-5.00           LYM#      [facility]  

 

                      0.8 10*3/uL 0.0-0.5    Above high normal EOS#       [facility]

n ([facility])                              

 

                    0.2 10*3/uL 0.0-0.2             BASO#     [facility] ([facility]  

 

                    47.7 %    50.0-80.0 Below low normal MICHELE%      [facility] ([facility]  

 

                    20.8 %    25.0-50.0 Below low normal LYM%      [facility] ([facility]  

 

                    9.7 %     0.0-5.0   Above high normal EOS%      [facility] ([facility]  

 

                    19.2 %    2.0-10.0  Above high normal MONO%     [facility] ([facility] 



 

                    2.7 %     0.0-4.0             BASO%     [facility]  









                    ID                  Date                Data Source

 

                    [id number] 2021 01:10:00 PM 

EST [facility] 

([facility])









          Name      Value     Range     Interpretation Code Description Data Priscilla

rce(s) Supporting 

Document(s)

 

                    0.4 mg/dL 0.0-0.5             CRP       [facility] ([facility])  









                    ID                  Date                Data Source

 

                    [id number] 2021 01:10:00 PM 

EST [facility] 

([facility])









          Name      Value     Range     Interpretation Code Description Data Priscilla

rce(s) Supporting 

Document(s)

 

                    1.0 mg/dL 0.9-1.2             CREATININE [facility] ([facility])  

 

                    >60                           eGFR Non- Next

Gen ([facility])  

 

                    >60                           eGFR  [facility] 

([facility])  









                    ID                  Date                Data Source

 

                    [id number] 2021 01:10:00 PM 

EST [facility] 

([facility])









          Name      Value     Range     Interpretation Code Description Data Priscilla

rce(s) Supporting 

Document(s)

 

                    19 U/L    15-37               AST       [facility] ([facility])  









                    ID                  Date                Data Source

 

                    [id number] 2021 01:10:00 PM 

EST [facility] 

([facility])









          Name      Value     Range     Interpretation Code Description Data Priscilla

rce(s) Supporting 

Document(s)

 

                    27 U/L    30-65     Below low normal ALT       Next[facility] ([facility])  









                    ID                  Date                Data Source

 

                    [id number] 2021 01:10:00 PM 

EST [facility] 

([facility])









          Name      Value     Range     Interpretation Code Description Data Priscilla

rce(s) Supporting 

Document(s)

 

                    3.3 g/dL  3.4-4.4   Below low normal ALB       [facility] ([facility])  









                    ID                  Date                Data Source

 

                    [id number] 2021 01:10:00 PM 

EST [facility] 

([facility])









          Name      Value     Range     Interpretation Code Description Data Priscilla

rce(s) Supporting 

Document(s)

 

                    18 mm/Hr  0-20                ESR       [facility]  









                    ID                  Date                Data Source

 

                    [id number] 2021 01:10:00 PM 

EST [facility] 

([facility])









          Name      Value     Range     Interpretation Code Description Data Priscilla

rce(s) Supporting 

Document(s)

 

                    8.2 10*3/uL 3.7-10.1            WBC       [facility] ([facility])  

 

                    4.64 10*6/uL 4.00-5.90           RBC       [facility]  

 

                    14.6 g/dL 13.0-18.0           HGB       [facility]  

 

                    45.0 %    39.0-55.0           HCT       [facility]  

 

                    96.9 fL   70.0-100.0           MCV       [facility] ([facility]  

 

                    31.5 pg   26.0-34.0           MCH       [facility]  

 

                    11.7 %    10.0-15.0           RDW       [facility]  

 

                    32.5 g/dL 31.0-37.0           MCHC      [facility]  

 

                    415 10*3/uL 150-500             PLATELETS [facility] ([facility])  

 

                    7.1 fL    6.0-10.0            MPV       [facility]  

 

                    1.05 10*3/uL 1.00-5.00           LYM#      [facility]  

 

                    6.07 10*3/uL 2.10-8.00           MICHELE#      [facility]  

 

                    0.1 10*3/uL 0.0-0.5             EOS#      [facility]  

 

                    0.78 10*3/uL 0.10-1.00           MONO#     [facility]  

 

                    0.2 10*3/uL 0.0-0.2             BASO#     [facility]  

 

                    12.8 %    25.0-50.0 Below low normal LYM%      [facility] ([facility]  

 

                    73.8 %    50.0-80.0           MICHELE%      [facility] ([facility])  

 

                    1.8 %     0.0-5.0             EOS%      [facility] ([facility])  

 

                    9.5 %     2.0-10.0            MONO%     [facility] ([facility])  

 

                    2.1 %     0.0-4.0             BASO%     [facility] ([facility])  







                                        Procedure

 

                                          



                                                                                
                                                                                
                                                                            



Social History

          



           Code       Duration   Value      Status     Description Data Source(s

)

 

           Caffeine Use Details 2021 12:00:00 AM EDT            completed 

  and coffee, 1 cup 

[facility] ([facility])

 

             Smoking      2021 12:00:00 AM EDT Unknown if ever smoked comp

leted    Unknown if 

ever smoked                             [facility] ([facility])

 

                      2021 12:00:00 AM EDT Chews tobacco completed  Chews 

tobacco [facility] 

([facility])



                                                                                
                                     



Vital Signs

          



                    ID                  Date                Data Source

 

                    UNK                                      









           Name       Value      Range      Interpretation Code Description Data

 Source(s)

 

           Body mass index (BMI) [Ratio] 31.6 kg/m2                       31.6 k

g/m2 [facility] ([facility]

 

           Systolic blood pressure 144 mm[Hg]                       144 mm[Hg] M

[facility] ([facility]

 

           Diastolic blood pressure 84 mm[Hg]                        84 mm[Hg]  

[facility] ([facility]

 

           Body temperature 98.6 [degF]                       98.6 [degF] [facility]

 ([facility]

 

           Heart rate 66 /min                          66 /min    [facility] ([facility]

 

           Respiratory rate 14 /min                          14 /min    [facility] (

[facility]

 

           Body height 68 [in_i]                        68 [in_i]  [facility] ([facility]

 

                                        5'8" 

 

           Body weight 208.00 [lb_av]                       208.00 [lb_av] [facility]

T ([facility]

 

           Ideal body weight 154 [lb_av]                       154 [lb_av] [facility]

T ([facility]

 

           Oxygen saturation in Arterial blood by Pulse oximetry 95 %           

                  95 %       [facility] 

([facility]

 

           Systolic blood pressure 122 mm[Hg]                       122 mm[Hg] N

[facility] ([facility]

 

           Diastolic blood pressure 82 mm[Hg]                        82 mm[Hg]  

[facility] ([facility]

 

           Body weight 94.801 kg                        94.801 kg  [facility] ([facility]

 

           Systolic blood pressure 130 mm[Hg]                       130 mm[Hg] N

[facility] ([facility]

 

           Diastolic blood pressure 90 mm[Hg]                        90 mm[Hg]  

[facility] ([facility]

 

           Heart rate 74 /min                          74 /min    [facility]

 

           Body temperature 36.39 Amaris                        36.39 Amaris  [facility] 

([facility])

 

           Respiratory rate 18 /min                          18 /min    [facility] 

([facility])

 

           Systolic blood pressure 124 mm[Hg]                       124 mm[Hg] N

[facility] ([facility]

 

           Diastolic blood pressure 72 mm[Hg]                        72 mm[Hg]  

[facility] ([facility]

 

           Heart rate 72 /min                          72 /min    [facility]

 

           Respiratory rate 14 /min                          14 /min    [facility] 

([facility])

 

           Body height 172.72 cm                        172.72 cm  [facility]

 

           Body weight 95.254 kg                        95.254 kg  [facility]

 

           Systolic blood pressure 136 mm[Hg]                       136 mm[Hg] N

[facility] ([facility]

 

           Diastolic blood pressure 84 mm[Hg]                        84 mm[Hg]  

[facility] ([facility]

 

           Heart rate 84 /min                          84 /min    [facility]

 

           Body temperature 36.39 Amaris                        36.39 Amaris  [facility] 

([facility])

 

           Respiratory rate 17 /min                          17 /min    [facility] 

([facility])

 

           Body mass index (BMI) [Ratio] 31.93 kg/m2            Overweight 31.93

 kg/m2 [facility] 

([facility])

 

           Body height 172.72 cm                        172.72 cm  [facility]

 

           Body weight 96.162 kg                        96.162 kg  [facility]

 

           Systolic blood pressure 140 mm[Hg]                       140 mm[Hg] N

[facility] ([facility]

 

           Diastolic blood pressure 86 mm[Hg]                        86 mm[Hg]  

[facility] ([facility]

 

           Body mass index (BMI) [Ratio] 32.23 kg/m2            Overweight 32.23

 kg/m2 [facility] 

([facility])

 

           Systolic blood pressure 126 mm[Hg]                       126 mm[Hg] N

[facility] ([facility]

 

           Diastolic blood pressure 80 mm[Hg]                        80 mm[Hg]  

[facility] ([facility]

 

           Body height 172.72 cm                        172.72 cm  [facility]

 

           Systolic blood pressure 122 mm[Hg]                       122 mm[Hg] N

[facility] ([facility]

 

           Diastolic blood pressure 70 mm[Hg]                        70 mm[Hg]  

[facility] ([facility]

 

           Heart rate 80 /min                          80 /min    [facility]

 

           Body temperature 36.61 Amaris                        36.61 Amaris  [facility] 

([facility])

 

           Respiratory rate 16 /min                          16 /min    [facility] 

([facility])

 

           Body mass index (BMI) [Ratio] 28.43 kg/m2            Overweight 28.43

 kg/m2 [facility] 

([facility])

 

           Body weight 84.822 kg                        84.822 kg  [facility] ([facility]

 

           Systolic blood pressure 142 mm[Hg]                       142 mm[Hg] N

[facility] ([facility]

 

           Diastolic blood pressure 72 mm[Hg]                        72 mm[Hg]  

[facility] ([facility]

 

           Body weight 85.729 kg                        85.729 kg  [facility] ([facility]

 

           Systolic blood pressure 136 mm[Hg]                       136 mm[Hg] N

[facility] ([facility]

 

           Diastolic blood pressure 82 mm[Hg]                        82 mm[Hg]  

[facility] ([facility]

 

           Heart rate 68 /min                          68 /min    [facility]

 

           Body temperature 36.33 Amaris                        36.33 Amaris  [facility] 

([facility])

 

           Respiratory rate 17 /min                          17 /min    [facility] 

([facility])

 

           Systolic blood pressure 122 mm[Hg]                       122 mm[Hg] N

[facility] ([facility]

 

           Diastolic blood pressure 86 mm[Hg]                        86 mm[Hg]  

[facility] ([facility]

 

           Body temperature 36.39 Amaris                        36.39 Amaris  [facility] 

([facility])

 

           Body height 172.72 cm                        172.72 cm  [facility] ([facility]

 

           Body weight 92.533 kg                        92.533 kg  [facility] ([facility]

 

           Systolic blood pressure 124 mm[Hg]                       124 mm[Hg] N

[facility] ([facility]

 

           Diastolic blood pressure 86 mm[Hg]                        86 mm[Hg]  

[facility] ([facility]

 

           Heart rate 76 /min                          76 /min    [facility]

 

           Respiratory rate 16 /min                          16 /min    [facility] 

([facility])

 

           Body mass index (BMI) [Ratio] 31.02 kg/m2            Overweight 31.02

 kg/m2 [facility] 

([facility])

 

           Respiratory rate 14 /min                          14 /min    [facility] (

[facility]

 

           Systolic blood pressure 138 mm[Hg]                       138 mm[Hg] M

[facility] ([facility]

 

           Diastolic blood pressure 88 mm[Hg]                        88 mm[Hg]  

[facility] ([facility]

 

           Heart rate 80 /min                          80 /min    [facility] ([facility]

 

           Body weight 207.00 [lb_av]                       207.00 [lb_av] [facility]

T ([facility]

 

           Systolic blood pressure 118 mm[Hg]                       118 mm[Hg] N

[facility] ([facility]

 

           Diastolic blood pressure 70 mm[Hg]                        70 mm[Hg]  

[facility] ([facility]

 

           Body height 172.72 cm                        172.72 cm  [facility]

 

           Body weight 90.718 kg                        90.718 kg  [facility]

 

           Systolic blood pressure 134 mm[Hg]                       134 mm[Hg] N

[facility] ([facility]

 

           Diastolic blood pressure 88 mm[Hg]                        88 mm[Hg]  

[facility] ([facility]

 

           Heart rate 78 /min                          78 /min    [facility]

 

           Body temperature 36.22 Amaris                        36.22 Amaris  [facility] 

([facility])

 

           Respiratory rate 16 /min                          16 /min    [facility] 

([facility])

 

           Body mass index (BMI) [Ratio] 30.41 kg/m2            Overweight 30.41

 kg/m2 [facility] 

([facility])



                                                                                
                  



Patient Treatment Plan of Care

          



             Planned Activity Planned Date Details      Description  Data Source

(s)

 

             Sulfasalazine 500 MG Oral Tablet 2021 12:00:00 AM EDT        

                   [facility] 

([facility])

 

             Folic Acid 1 MG Oral Tablet 2021 12:00:00 AM EDT             

              [facility] ([facility]

 

             Hydroxychloroquine Sulfate 200 MG Oral Tablet 2021 12:00:00 A

M EDT                           

[facility] ([facility])

 

             leflunomide 20 MG Oral Tablet 2021 12:00:00 AM EDT           

                [facility]

 

             Sulfasalazine 500 MG Oral Tablet 03/10/2021 12:00:00 AM EST        

                   [facility] 

([facility])

 

             Folic Acid 1 MG Oral Tablet 03/10/2021 12:00:00 AM EST             

              [facility]

 

             leflunomide 20 MG Oral Tablet 03/10/2021 12:00:00 AM EST           

                [facility]

 

             Sulfasalazine 500 MG Oral Tablet 2021 12:00:00 AM EST        

                   [facility] 

([facility])

 

             Folic Acid 1 MG Oral Tablet 2021 12:00:00 AM EST             

              [facility]

 

             leflunomide 20 MG Oral Tablet 2021 12:00:00 AM EST           

                [facility]

 

             Hydroxychloroquine Sulfate 200 MG Oral Tablet 10/13/2020 12:00:00 A

M EDT                           

[facility] ([facility])

 

             Folic Acid 1 MG Oral Tablet 10/13/2020 12:00:00 AM EDT             

              [facility]

 

             Sulfasalazine 500 MG Oral Tablet 10/13/2020 12:00:00 AM EDT        

                   [facility] 

([facility])

 

             leflunomide 20 MG Oral Tablet 10/13/2020 12:00:00 AM EDT           

                [facility]

 

             Dexamethasone 4 MG Oral Tablet                                     

   [facility]

## 2021-11-08 NOTE — CCD
Summarization Of Episode

                             Created on: 2021



Laci Altamirano

External Reference #: [id number]

: 1955

Sex: Male



Demographics





                          Address                   [address] [place]  [address]

 

                          Home Phone                [phone]

 

                          Preferred Language        English

 

                          Marital Status            Unknown

 

                          Roman Catholic Affiliation     Unknown

 

                          Race                      White

 

                          Ethnic Group              Not  or 





Author





                          Author                    [facility]

 

                          Organization              [facility]

 

                          Address                   Unknown

 

                          Phone                     Unavailable







Support





                Name            Relationship    Address         Phone

 

                    MIGUEL ALTAMIRANO  Next Of Kin         [address] [facility] [address]

[place]  [address]                    [phone]

 

                    ALLEN RODRIGUEZ      Next Of Kin         [address]

[place]  [address]                       [phone]

 

                DISABLED        Next Of Kin     Unknown         Unavailable

 

                    JOVITA ALTAMIRANO    [place]  [address]                    [phone]

 

                    MIGUEL ALTAMIRANO  ECON                [address] [place]  [address]                       [phone]

 

                    Andrez Rodriguez     ECON                [address] [place]  [address]               Unavailable







Care Team Providers





                    Care Team Member Name Role                Phone

 

                    CODY MARTELL MD Unavailable         Unavailable

 

                    CODY MARTELL MD Unavailable         Unavailable

 

                    CODY MARTELL MD Unavailable         Unavailable

 

                    CODY MARTELL MD Unavailable         Unavailable

 

                    CODY MARTELL MD Unavailable         Unavailable

 

                    CODY MARTELL MD Unavailable         Unavailable

 

                    CODY MARTELL MD Unavailable         Unavailable

 

                    CODY MARTELL MD Unavailable         Unavailable

 

                    CODY MARTELL MD Unavailable         Unavailable

 

                    CODY MARTELL MD Unavailable         Unavailable

 

                    CODY MARTELL MD Unavailable         Unavailable

 

                    CODY MARTELL MD Unavailable         Unavailable

 

                    CODY MARTELL MD Unavailable         Unavailable

 

                    CODY MARTELL MD Unavailable         Unavailable

 

                    CODY MARTELL MD Unavailable         Unavailable

 

                    CODY MARTELL MD Unavailable         Unavailable

 

                    CODY MARTELL MD Unavailable         Unavailable

 

                    CODY MARTELL MD Unavailable         Unavailable

 

                    CODY MARTELL MD Unavailable         Unavailable

 

                    CODY MARTELL MD Unavailable         Unavailable

 

                    CODY MARTELL MD Unavailable         Unavailable

 

                    CODY MARTELL MD Unavailable         Unavailable

 

                    CODY MARTELL MD Unavailable         Unavailable

 

                    CODY MARTELL MD Unavailable         Unavailable

 

                    CODY MARTELL MD Unavailable         Unavailable

 

                    CODY MARTELL MD Unavailable         Unavailable

 

                    CODY MARTELL MD Unavailable         Unavailable

 

                    CODY MARTELL MD Unavailable         Unavailable

 

                    CODY MARTELL MD Unavailable         Unavailable

 

                    CODY MARTELL MD Unavailable         Unavailable

 

                    CODY MARTELL MD Unavailable         Unavailable

 

                    CODY MARTELL MD Unavailable         Unavailable

 

                    CODY MARTELL MD Unavailable         Unavailable

 

                    CODY MARTELL MD Unavailable         Unavailable

 

                    CODY MARTELL MD Unavailable         Unavailable

 

                    CODY MARTELL MD Unavailable         Unavailable

 

                    CODY MARTELL MD Unavailable         Unavailable

 

                    CODY MARTELL MD Unavailable         Unavailable

 

                    CODY MARTELL MD Unavailable         Unavailable

 

                    CODY MARTELL MD Unavailable         Unavailable

 

                    CODY MARTELL MD Unavailable         Unavailable

 

                    CODY MARTELL MD Unavailable         Unavailable

 

                    CODY MARTELL MD Unavailable         Unavailable

 

                    CODY MARTELL MD Unavailable         Unavailable

 

                    CODY MARTELL MD Unavailable         Unavailable

 

                    CODY MARTELL MD Unavailable         Unavailable

 

                    CODY MARTELL MD Unavailable         Unavailable

 

                    CODY MARTELL MD Unavailable         Unavailable

 

                    CODY MARTELL MD Unavailable         Unavailable

 

                    CODY MARTELL MD Unavailable         Unavailable

 

                    CODY MARTELL MD Unavailable         Unavailable

 

                    CODY MARTELL MD Unavailable         Unavailable

 

                    CODY MARTELL MD Unavailable         Unavailable

 

                    CODY MARTELL MD Unavailable         Unavailable

 

                    CODY MARTELL MD Unavailable         Unavailable

 

                    CODY MARTELL MD Unavailable         Unavailable

 

                    CODY MARTELL MD Unavailable         Unavailable

 

                    CODY MARTELL MD Unavailable         Unavailable

 

                    CODY MARTELL MD Unavailable         Unavailable

 

                    CODY MARTELL MD Unavailable         Unavailable

 

                    CODY MARTELL MD Unavailable         Unavailable

 

                    CODY MARTELL MD Unavailable         Unavailable

 

                    CODY MARTELL MD Unavailable         Unavailable

 

                    CODY MARTELL MD Unavailable         Unavailable

 

                    CODY MARTELL MD Unavailable         Unavailable

 

                    CODY MARTELL MD Unavailable         Unavailable

 

                    CODY MARTELL MD Unavailable         Unavailable

 

                    CODY MARTELL MD Unavailable         Unavailable

 

                    CODY MARTELL MD Unavailable         Unavailable

 

                    CODY MARTELL MD Unavailable         Unavailable

 

                    CODY MARTELL MD Unavailable         Unavailable

 

                    CODY MARTELL MD Unavailable         Unavailable

 

                    CODY MARTELL MD Unavailable         Unavailable

 

                    CODY MARTELL MD Unavailable         Unavailable

 

                    CODY MARTELL MD Unavailable         Unavailable

 

                    CODY MARTELL MD Unavailable         Unavailable

 

                    MCILVAIN, M MAREK PA Unavailable         Unavailable

 

                    MCILVAIN, M MAREK PA Unavailable         Unavailable

 

                    MCILVAIN, M MAREK PA Unavailable         Unavailable

 

                    MCILVAIN, M MAREK PA Unavailable         Unavailable

 

                    MCILVAIN, M MAREK PA Unavailable         Unavailable

 

                    MCILVAIN, M MAREK PA Unavailable         Unavailable

 

                    MCILVAIN, M MAREK PA Unavailable         Unavailable

 

                    MCILVAIN, M MAREK PA Unavailable         Unavailable

 

                    MCILVAIN, M MAREK PA Unavailable         Unavailable

 

                    MCILVAIN, M MAREK PA Unavailable         Unavailable

 

                    MCILVAIN, M MAREK PA Unavailable         Unavailable

 

                    MCILVAIN, M MAREK PA Unavailable         Unavailable

 

                    MCILVAIN, M MAREK PA Unavailable         Unavailable

 

                    MCILVAIN, M MAREK PA Unavailable         Unavailable

 

                    MCILVAIN, M MAREK PA Unavailable         Unavailable

 

                    MCILVAIN, M MAREK PA Unavailable         Unavailable

 

                    MCILVAIN, M MAREK PA Unavailable         Unavailable

 

                    MCILVAIN, M MAREK PA Unavailable         Unavailable

 

                    MCILVAIN, M MAREK PA Unavailable         Unavailable

 

                    MCILVAIN, M MAREK PA Unavailable         Unavailable

 

                    MCILVAIN, M MAREK PA Unavailable         Unavailable

 

                    MCILVAIN, M MAREK PA Unavailable         Unavailable

 

                    MCILVAIN, M MRAEK PA Unavailable         Unavailable

 

                    MCILVAIN, M MAREK PA Unavailable         Unavailable

 

                    MCILVAIN, M MAREK PA Unavailable         Unavailable

 

                    MCILVAIN, M MAREK PA Unavailable         Unavailable

 

                    MCILVAIN, M MAREK PA Unavailable         Unavailable

 

                    MCILVAIN, M MAREK PA Unavailable         Unavailable

 

                    MCILVAIN, M MAREK PA Unavailable         Unavailable

 

                    MCILVAIN, M MAREK PA Unavailable         Unavailable

 

                    MCILVAIN, M MAREK PA Unavailable         Unavailable

 

                    MCILVAIN, M MAREK PA Unavailable         Unavailable

 

                    MCILVAIN, M MAREK PA Unavailable         Unavailable

 

                    MCILVAIN, M MAREK PA Unavailable         Unavailable

 

                    MCILVAIN, M MAREK PA Unavailable         Unavailable

 

                    MCILVAIN, M MAREK PA Unavailable         Unavailable

 

                    MCILVAIN, M MAREK PA Unavailable         Unavailable

 

                    MCILVAIN, M MAREK PA Unavailable         Unavailable

 

                    MCILVAIN, M MAREK PA Unavailable         Unavailable

 

                    MCILVAIN, M MAREK PA Unavailable         Unavailable

 

                    MCILVAIN, M MAREK PA Unavailable         Unavailable

 

                    MCILVAIN, M MAREK PA Unavailable         Unavailable

 

                    MCILVAIN, M MAREK PA Unavailable         Unavailable

 

                    MCILVAIN, M MAREK PA Unavailable         Unavailable

 

                    MCILVAIN, M MAREK PA Unavailable         Unavailable

 

                    MCILVAIN, M MAREK PA Unavailable         Unavailable

 

                    MCILVAIN, M MAREK PA Unavailable         Unavailable

 

                    MCILVAIN, M MAREK PA Unavailable         Unavailable

 

                    MCILVAIN, M MAREK PA Unavailable         Unavailable

 

                    MCILVAIN, M MAREK PA Unavailable         Unavailable

 

                    MCILVAIN, M MAREK PA Unavailable         Unavailable

 

                    MCILVAIN, M MAREK PA Unavailable         Unavailable

 

                    MCILVAIN, M MAREK PA Unavailable         Unavailable

 

                    MCILVAIN, M MAREK PA Unavailable         Unavailable

 

                    MCILVAIN, M MAREK PA Unavailable         Unavailable

 

                    MCILVAIN, M MAREK PA Unavailable         Unavailable

 

                    MCILVAIN, M MAREK PA Unavailable         Unavailable

 

                    MCILVAIN, M MAREK PA Unavailable         Unavailable

 

                    MCILVAIN, M MAREK PA Unavailable         Unavailable

 

                    MCILVAIN, M MAREK PA Unavailable         Unavailable

 

                    MCILVAIN, M MAREK PA Unavailable         Unavailable

 

                    MCILVAIN, M MAREK PA Unavailable         Unavailable

 

                    MCILVAIN, M MAREK PA Unavailable         Unavailable

 

                    MCILVAIN, M MAREK PA Unavailable         Unavailable

 

                    [place] Windy, A Tressa PA Unavailable         Unavailable

 

                    [place] Windy, A Tressa PA Unavailable         Unavailable

 

                    [place] Windy, A Tressa PA Unavailable         Unavailable

 

                    [place] Windy, A Tressa PA Unavailable         Unavailable

 

                    [place] Windy, A Tressa PA Unavailable         Unavailable

 

                    [place] Windy, A Tressa PA Unavailable         Unavailable

 

                    [place] Windy, A Tressa PA Unavailable         Unavailable

 

                    [place] Windy, A Tressa PA Unavailable         Unavailable

 

                    [place] Windy, A Tressa PA Unavailable         Unavailable

 

                    [place] Windy, A Tressa PA Unavailable         Unavailable

 

                    [place] Windy, A Tressa PA Unavailable         Unavailable

 

                    [place] Windy, A Tressa PA Unavailable         Unavailable

 

                    [place] Windy, A Tressa PA Unavailable         Unavailable

 

                    [place] Windy, A Tressa PA Unavailable         Unavailable

 

                    [place] Windy, A Tressa PA Unavailable         Unavailable

 

                    [place] Windy, A Tressa PA Unavailable         Unavailable

 

                    [place] Windy, A Tressa PA Unavailable         Unavailable

 

                    [place] Windy, A Tressa PA Unavailable         Unavailable

 

                    [place] Windy, A Tressa PA Unavailable         Unavailable

 

                    [place] Windy, A Tressa PA Unavailable         Unavailable

 

                    [place] Windy, A Tressa PA Unavailable         Unavailable

 

                    [place] Windy, A Tressa PA Unavailable         Unavailable

 

                    [place] Windy, A Tressa PA Unavailable         Unavailable

 

                    [place] Windy, A Tressa PA Unavailable         Unavailable

 

                    [place] Windy, A Tressa PA Unavailable         Unavailable

 

                    [place] Windy, A Tressa PA Unavailable         Unavailable

 

                    [place] Windy, A Tressa PA Unavailable         Unavailable

 

                    [place] Windy, A Tressa PA Unavailable         Unavailable

 

                    [place] Windy, A Tressa PA Unavailable         Unavailable

 

                    [place] Windy, A Tressa PA Unavailable         Unavailable

 

                    [place] Windy, A Tressa PA Unavailable         Unavailable

 

                    [place] Windy, A Tressa PA Unavailable         Unavailable

 

                    [place] Windy, A Tressa PA Unavailable         Unavailable

 

                    [place] Windy, A Tressa PA Unavailable         Unavailable

 

                    [place] Windy, A Tressa PA Unavailable         Unavailable

 

                    [place] Windy, A Tressa PA Unavailable         Unavailable

 

                    [place] Windy, A Tressa PA Unavailable         Unavailable

 

                    [place] Windy, A Tressa PA Unavailable         Unavailable

 

                    [place] Windy, A Tressa PA Unavailable         Unavailable

 

                    [place] Windy, A Tressa PA Unavailable         Unavailable

 

                    [place] Windy, A Tressa PA Unavailable         Unavailable

 

                    [place] Windy, A Tressa PA Unavailable         Unavailable

 

                    [place] Windy, A Tressa PA Unavailable         Unavailable

 

                    [place] Windy, A Tressa PA Unavailable         Unavailable

 

                    [place] Windy, A Tressa PA Unavailable         Unavailable

 

                    [place] Windy, A Tressa PA Unavailable         Unavailable

 

                    [place] Windy, A Tressa PA Unavailable         Unavailable

 

                    [place] Windy, A Tressa PA Unavailable         Unavailable

 

                    [place] Windy, A Tressa PA Unavailable         Unavailable

 

                    [place] Windy, A Tressa PA Unavailable         Unavailable

 

                    [place] Windy, A Tressa PA Unavailable         Unavailable

 

                    Lawanda Funk FNP Unavailable         Unavailable

 

                    Tibbits, Lawanda Pau FNP Unavailable         Unavailable

 

                    Tibbits, Lawanda Pau FNP Unavailable         Unavailable

 

                    Tibbits, Lawanda Pau FNP Unavailable         Unavailable

 

                    Tibbits, Lawanda Pau FNP Unavailable         Unavailable

 

                    Tibbits, Lawanda Pau FNP Unavailable         Unavailable

 

                    Tibbits, Lawanda Pau FNP Unavailable         Unavailable

 

                    Tibbits, Lawanda Pau FNP Unavailable         Unavailable

 

                    Tibbits, Lawanda Pau FNP Unavailable         Unavailable

 

                    Tibbits, Lawanda Pau FNP Unavailable         Unavailable

 

                    Tibbits, Lawanda Pau FNP Unavailable         Unavailable

 

                    Tibbits, Lawanda Pua FNP Unavailable         Unavailable

 

                    Tibbits, Lawanda Pau FNP Unavailable         Unavailable

 

                    Tibbits, Lawanda Pau FNP Unavailable         Unavailable

 

                    Tibbits, Lawanda Pau FNP Unavailable         Unavailable

 

                    Tibbits, Lawanda Pau FNP Unavailable         Unavailable

 

                    Tibbits, Lawanda Pau FNP Unavailable         Unavailable

 

                    Tibbits, Lawanda Pau FNP Unavailable         Unavailable

 

                    Tibbits, Lawanda Pau FNP Unavailable         Unavailable

 

                    Tibbits, Lawanda Pau FNP Unavailable         Unavailable

 

                    Tibbits, Lawanda Pau FNP Unavailable         Unavailable

 

                    Tibbits, Lawanda Pau FNP Unavailable         Unavailable

 

                    Tibbits, Lawanda Pau FNP Unavailable         Unavailable

 

                    Tibbits, Lawanda Pau FNP Unavailable         Unavailable

 

                    Tibbits, Lawanda Pau FNP Unavailable         Unavailable

 

                    Tibbits, Lawanda Pau FNP Unavailable         Unavailable

 

                    Tibbits, Lawanda Pau FNP Unavailable         Unavailable

 

                    Tibbits, Lawanda Pau FNP Unavailable         Unavailable

 

                    Tibbits, Lawanda Pau FNP Unavailable         Unavailable

 

                    Tibbits, Lawanda Pau FNP Unavailable         Unavailable

 

                    Tibbits, Lawanda Pau FNP Unavailable         Unavailable

 

                    Tibbits, Lawanda Pau FNP Unavailable         Unavailable

 

                    Tibbits, Lawanda Pau FNP Unavailable         Unavailable

 

                    Tibbits, Lawanda Pau FNP Unavailable         Unavailable

 

                    Tibbits, Lawanda Pau FNP Unavailable         Unavailable

 

                    Tibbits, Lawanda Pau FNP Unavailable         Unavailable

 

                    Tibbits, Lawanda Pau FNP Unavailable         Unavailable

 

                    Tibbits, Lawanda Pau FNP Unavailable         Unavailable

 

                    Tibbits, Lawanda Pau FNP Unavailable         Unavailable

 

                    Tibbits, Lawanda Pau FNP Unavailable         Unavailable

 

                    Tibbits, Lawanda Pau FNP Unavailable         Unavailable

 

                    Tibbits, Lawanda Pau FNP Unavailable         Unavailable

 

                    KHAIRALLAH,  RAMZI MD Unavailable         Unavailable

 

                    KHAIRALLAH,  RAMZI MD Unavailable         Unavailable

 

                    KHAIRALLAH,  RAMZI MD Unavailable         Unavailable

 

                    KHAIRALLAH,  RAMZI MD Unavailable         Unavailable

 

                    KHAIRALLAH,  RAMZI MD Unavailable         Unavailable

 

                    KHAIRALLAH,  RAMZI MD Unavailable         Unavailable

 

                    KHAIRALLAH,  RAMZI MD Unavailable         Unavailable

 

                    KHAIRALLAH,  RAMZI MD Unavailable         Unavailable

 

                    KHAIRALLAH,  RAMZI MD Unavailable         Unavailable

 

                    KHAIRALLAH,  RAMZI MD Unavailable         Unavailable

 

                    KHAIRALLAH,  RAMZI MD Unavailable         Unavailable

 

                    KHAIRALLAH,  RAMZI MD Unavailable         Unavailable

 

                    KHAIRALLAH,  RAMZI MD Unavailable         Unavailable

 

                    KHAIRALLAH,  RAMZI MD Unavailable         Unavailable

 

                    KHAIRALLAH,  RAMZI MD Unavailable         Unavailable

 

                    KHAIRALLAH,  RAMZI MD Unavailable         Unavailable

 

                    KHAIRALLAH,  RAMZI MD Unavailable         Unavailable

 

                    KHAIRALLAH,  RAMZI MD Unavailable         Unavailable

 

                    KHAIRALLAH,  RAMZI MD Unavailable         Unavailable

 

                    KHAIRALLAH,  RAMZI MD Unavailable         Unavailable

 

                    KHAIRALLAH,  RAMZI MD Unavailable         Unavailable

 

                    KHAIRALLAH,  RAMZI MD Unavailable         Unavailable

 

                    KHAIRALLAH,  RAMZI MD Unavailable         Unavailable

 

                    KHAIRALLAH,  RAMZI MD Unavailable         Unavailable

 

                    KHAIRALLAH,  RAMZI MD Unavailable         Unavailable

 

                    KHAIRALLAH,  RAMZI MD Unavailable         Unavailable

 

                    KHAIRALLAH,  RAMZI MD Unavailable         Unavailable

 

                    KHAIRALLAH,  RAMZI MD Unavailable         Unavailable

 

                    KHAIRALLAH,  RAMZI MD Unavailable         Unavailable

 

                    KHAIRALLAH,  RAMZI MD Unavailable         Unavailable

 

                    KHAIRALLAH,  RAMZI MD Unavailable         Unavailable

 

                    KHAIRALLAH,  RAMZI MD Unavailable         Unavailable

 

                    KHAIRALLAH,  RAMZI MD Unavailable         Unavailable

 

                    KHAIRALLAH,  RAMZI MD Unavailable         Unavailable

 

                    KHAIRALLAH,  RAMZI MD Unavailable         Unavailable

 

                    KHAIRALLAH,  RAMZI MD Unavailable         Unavailable

 

                    KHAIRALLAH,  RAMZI MD Unavailable         Unavailable

 

                    KHAIRALLAH,  RAMZI MD Unavailable         Unavailable

 

                    KHAIRALLAH,  RAMZI MD Unavailable         Unavailable

 

                    KHAIRALLAH,  RAMZI MD Unavailable         Unavailable

 

                    KHAIRALLAH,  RAMZI MD Unavailable         Unavailable

 

                    KHAIRALLAH,  RAMZI MD Unavailable         Unavailable

 

                    KHAIRALLAH,  RAMZI MD Unavailable         Unavailable

 

                    KHAIRALLAH,  RAMZI MD Unavailable         Unavailable

 

                    KHAIRALLAH,  RAMZI MD Unavailable         Unavailable

 

                    KHAIRALLAH,  RAMZI MD Unavailable         Unavailable

 

                    KHAIRALLAH,  RAMZI MD Unavailable         Unavailable

 

                    KHAIRALLAH,  RAMZI MD Unavailable         Unavailable

 

                    KHAIRALLAH,  RAMZI MD Unavailable         Unavailable

 

                    KHAIRALLAH,  RAMZI MD Unavailable         Unavailable

 

                    KHAIRALLAH,  RAMZI MD Unavailable         Unavailable

 

                    KHAIRALLAH,  RAMZI MD Unavailable         Unavailable

 

                    KHAIRALLAH,  RAMZI MD Unavailable         Unavailable

 

                    KHAIRALLAH,  RAMZI MD Unavailable         Unavailable

 

                    KHAIRALLAH,  RAMZI MD Unavailable         Unavailable

 

                    KHAIRALLAH,  RAMZI MD Unavailable         Unavailable

 

                    KHAIRALLAH,  RAMZI MD Unavailable         Unavailable

 

                    KHAIRALLAH,  RAMZI MD Unavailable         Unavailable

 

                    KHAIRALLAH,  RAMZI MD Unavailable         Unavailable

 

                    KHAIRALLAH,  RAMZI MD Unavailable         Unavailable

 

                    KHAIRALLAH,  RAMZI MD Unavailable         Unavailable

 

                    KHAIRALLAH,  RAMZI MD Unavailable         Unavailable

 

                    KHAIRALLAH,  RAMZI MD Unavailable         Unavailable

 

                    KHAIRALLAH,  RAMZI MD Unavailable         Unavailable

 

                    KHAIRALLAH,  RAMZI MD Unavailable         Unavailable

 

                    KHAIRALLAH,  RAMZI MD Unavailable         Unavailable

 

                    KHAIRALLAH,  RAMZI MD Unavailable         Unavailable

 

                    KHAIRALLAH,  RAMZI MD Unavailable         Unavailable

 

                    KHAIRALLAH,  RAMZI MD Unavailable         Unavailable

 

                    KHAIRALLAH,  RAMZI MD Unavailable         Unavailable

 

                    KHAIRALLAH,  RAMZI MD Unavailable         Unavailable

 

                    KHAIRALLAH,  RAMZI MD Unavailable         Unavailable

 

                    KHAIRALLAH,  RAMZI MD Unavailable         Unavailable

 

                    KHAIRALLAH,  RAMZI MD Unavailable         Unavailable

 

                    KHAIRALLAH,  RAMZI MD Unavailable         Unavailable

 

                    KHAIRALLAH,  RAMZI MD Unavailable         Unavailable

 

                    KHAIRALLAH,  RAMZI MD Unavailable         Unavailable

 

                    KHAIRALLAH,  RAMZI MD Unavailable         Unavailable

 

                    KHAIRALLAH,  RAMZI MD Unavailable         Unavailable

 

                    KHAIRALLAH,  RAMZI MD Unavailable         Unavailable

 

                    KHAIRALLAH,  RAMZI MD Unavailable         Unavailable

 

                    KHAIRALLAH,  RAMZI MD Unavailable         Unavailable

 

                    KHAIRALLAH,  RAMZI MD Unavailable         Unavailable

 

                    KHAIRALLAH,  RAMZI MD Unavailable         Unavailable

 

                    KHAIRALLAH,  RAMZI MD Unavailable         Unavailable

 

                    KHAIRALLAH,  RAMZI MD Unavailable         Unavailable

 

                    KHAIRALLAH,  RAMZI MD Unavailable         Unavailable

 

                    KHAIRALLAH,  RAMZI MD Unavailable         Unavailable

 

                    Liana Gleason MD   Unavailable         Unavailable

 

                    Liana Gleasno MD   Unavailable         Unavailable

 

                    Liana Gleason MD   Unavailable         Unavailable

 

                    Liana Gleason MD   Unavailable         Unavailable

 

                    Liana Gleason MD   Unavailable         Unavailable

 

                    Liana Gleason MD   Unavailable         Unavailable

 

                    Liana Gleason MD   Unavailable         Unavailable

 

                    Liana Gleason MD   Unavailable         Unavailable

 

                    Liana Gleason MD   Unavailable         Unavailable

 

                    Liana Gleason MD   Unavailable         Unavailable

 

                    Liana Gleason MD   Unavailable         Unavailable

 

                    Liana Gleason MD   Unavailable         Unavailable

 

                    Liana Gleason MD   Unavailable         Unavailable

 

                    Liana Gleason MD   Unavailable         Unavailable

 

                    Liana Gleason MD   Unavailable         Unavailable

 

                    Abdulky,  Liana MD   Unavailable         Unavailable

 

                    Abdulky,  Liana MD   Unavailable         Unavailable

 

                    Abdulky,  Liana MD   Unavailable         Unavailable

 

                    Abdulky,  Liana MD   Unavailable         Unavailable

 

                    Abdulky,  Liana MD   Unavailable         Unavailable

 

                    Abdulky,  Liana MD   Unavailable         Unavailable

 

                    Abdulky,  Liana MD   Unavailable         Unavailable

 

                    Abdulky,  Liana MD   Unavailable         Unavailable

 

                    Abdulky,  Liana MD   Unavailable         Unavailable

 

                    Abdulky,  Liana MD   Unavailable         Unavailable

 

                    Abdulky,  Liana MD   Unavailable         Unavailable

 

                    Abdulky,  Liana MD   Unavailable         Unavailable

 

                    Abdulky,  Liana MD   Unavailable         Unavailable

 

                    Abdulky,  Liana MD   Unavailable         Unavailable

 

                    Abdulky,  Liana MD   Unavailable         Unavailable

 

                    Abdulky,  Liana MD   Unavailable         Unavailable

 

                    Abdulky,  Liana MD   Unavailable         Unavailable

 

                    Abdulky,  Liana MD   Unavailable         Unavailable

 

                    Abdulky,  Liana MD   Unavailable         Unavailable

 

                    Abdulky,  Liana MD   Unavailable         Unavailable

 

                    Abdulky,  Liana MD   Unavailable         Unavailable

 

                    Abdulky,  Liana MD   Unavailable         Unavailable

 

                    Abdulky,  Liana MD   Unavailable         Unavailable

 

                    Abdulky,  Liana MD   Unavailable         Unavailable

 

                    Abdulky,  Liana MD   Unavailable         Unavailable

 

                    Abdulky,  Liana MD   Unavailable         Unavailable

 

                    Abdulky,  Liana MD   Unavailable         Unavailable

 

                    Abdulky,  Liana MD   Unavailable         Unavailable

 

                    Abdulky,  Liana MD   Unavailable         Unavailable

 

                    Abdulky,  Liana MD   Unavailable         Unavailable

 

                    Abdulky,  Liana MD   Unavailable         Unavailable

 

                    Abdulky,  Liana MD   Unavailable         Unavailable

 

                    Abdulky,  Liana MD   Unavailable         Unavailable

 

                    Abdulky,  Liana MD   Unavailable         Unavailable

 

                    Abdulky,  Liana MD   Unavailable         Unavailable

 

                    Abdulky,  Liana MD   Unavailable         Unavailable

 

                    Abdulky,  Liana MD   Unavailable         Unavailable

 

                    Abdulky,  Liana MD   Unavailable         Unavailable

 

                    Abdulky,  Liana MD   Unavailable         Unavailable

 

                    Abdulky,  Liana MD   Unavailable         Unavailable

 

                    Abdulky,  Liana MD   Unavailable         Unavailable

 

                    Abdulky,  Liana MD   Unavailable         Unavailable

 

                    Abdulky,  Liana MD   Unavailable         Unavailable

 

                    Abdulky,  Liana MD   Unavailable         Unavailable

 

                    Abdulky,  Liana MD   Unavailable         Unavailable

 

                    Abdulky,  Liana MD   Unavailable         Unavailable

 

                    Abdulky,  Liana MD   Unavailable         Unavailable

 

                    Abdulky,  Liana MD   Unavailable         Unavailable

 

                    Abdulky,  Liana MD   Unavailable         Unavailable

 

                    Abdulky,  Liana MD   Unavailable         Unavailable

 

                    Abdulky,  Liana MD   Unavailable         Unavailable

 

                    Abdulky,  Liana MD   Unavailable         Unavailable

 

                    Abdulky,  Liana MD   Unavailable         Unavailable

 

                    Abdulky,  Liana MD   Unavailable         Unavailable

 

                    Abdulky,  Liana MD   Unavailable         Unavailable

 

                    Abdulky,  Liana MD   Unavailable         Unavailable

 

                    Abdulky,  Liana MD   Unavailable         Unavailable

 

                    Abdulky,  Liana MD   Unavailable         Unavailable

 

                    Abdulky,  Liana MD   Unavailable         Unavailable

 

                    Abdulky,  Liana MD   Unavailable         Unavailable

 

                    Abdulky,  Liana MD   Unavailable         Unavailable

 

                    Abdulky,  Liana MD   Unavailable         Unavailable

 

                    Abdulky,  Liana MD   Unavailable         Unavailable

 

                    Abdulky,  Liana MD   Unavailable         Unavailable

 

                    Abdulky,  Liana MD   Unavailable         Unavailable

 

                    Abdulky,  Liana MD   Unavailable         Unavailable

 

                    Abdulky,  Liana MD   Unavailable         Unavailable

 

                    Abdulky,  Liana MD   Unavailable         Unavailable

 

                    Abdulky,  Liana MD   Unavailable         Unavailable

 

                    Abdulky,  Liana MD   Unavailable         Unavailable

 

                    Abdulky,  Liana MD   Unavailable         Unavailable

 

                    Abdulky,  Liana MD   Unavailable         Unavailable

 

                    Abdulky,  Liana MD   Unavailable         Unavailable

 

                    Abdulky,  Liana MD   Unavailable         Unavailable

 

                    Abdulky,  Liana MD   Unavailable         Unavailable

 

                    Abdulky,  Liana MD   Unavailable         Unavailable

 

                    Abdulky,  Liana MD   Unavailable         Unavailable

 

                    Abdulky,  Liana MD   Unavailable         Unavailable

 

                    Abdulky,  Liana MD   Unavailable         Unavailable

 

                    Abdulky,  Liana MD   Unavailable         Unavailable

 

                    Abdulky,  Liana MD   Unavailable         Unavailable

 

                    Abdulky,  Liana MD   Unavailable         Unavailable

 

                    Abdulky,  Liana MD   Unavailable         Unavailable

 

                    Abdulky,  Liana MD   Unavailable         Unavailable

 

                    Abdulky,  Liana MD   Unavailable         Unavailable

 

                    Abdulky,  Liana MD   Unavailable         Unavailable

 

                    Abdulky,  Liana MD   Unavailable         Unavailable

 

                    Abdulky,  Liana MD   Unavailable         Unavailable

 

                    Abdulky,  Liana MD   Unavailable         Unavailable

 

                    Abdulky,  Liana MD   Unavailable         Unavailable

 

                    Abdulky,  Liana MD   Unavailable         Unavailable



                                  



Re-disclosure Warning

          The records that you are about to access may contain information from 
federally-assisted alcohol or drug abuse programs. If such information is 
present, then the following federally mandated warning applies: This information
has been disclosed to you from records protected by federal confidentiality 
rules (42 CFR part 2). The federal rules prohibit you from making any further 
disclosure of this information unless further disclosure is expressly permitted 
by the written consent of the person to whom it pertains or as otherwise 
permitted by 42 CFR part 2. A general authorization for the release of medical 
or other information is NOT sufficient for this purpose. The Federal rules 
restrict any use of the information to criminally investigate or prosecute any 
alcohol or drug abuse patient.The records that you are about to access may 
contain highly sensitive health information, the redisclosure of which is 
protected by Article 27-F of the [facility] Public Health law. If you 
continue you may have access to information: Regarding HIV / AIDS; Provided by 
facilities licensed or operated by the [facility] Office of Mental Health; 
or Provided by the [facility] Office for People With Developmental 
Disabilities. If such information is present, then the following [place] 
mandated warning applies: This information has been disclosed to you from 
confidential records which are protected by state law. State law prohibits you 
from making any further disclosure of this information without the specific 
written consent of the person to whom it pertains, or as otherwise permitted by 
law. Any unauthorized further disclosure in violation of state law may result in
a fine or long-term sentence or both. A general authorization for the release of 
medical or other information is NOT sufficient authorization for further disc
losure.                                                                         
    



Family History

          



             Family Member Name Family Member Gender Family Member Status Date o

f Status 

Description                             Data Source(s)

 

           Unknown    Unknown    Problem                          [facility] ([facility] )

 

           Unknown    Male       Problem (finding) 2017 12:00:00 AM EDT   

         [facility] ([facility])

 

           Unknown    Male       Problem (finding) 2017 12:00:00 AM EDT   

         [facility] ([facility])

 

           Unknown    Male       Problem (finding) 2017 12:00:00 AM EDT   

         [facility] ([facility])

 

           Unknown    Male       Problem (finding) 2017 12:00:00 AM EDT   

         [facility] ([facility])



                                                                                
                 



Encounters

          



           Encounter  Providers  Location   Date       Indications Data Source(s

)

 

                Outpatient      Attender: ADRIANE MARTELL MD [facility] 2021 01:00:00 PM 

EDT                                                 [facility] ([facility]

 

                                Attender: Liana Gleason MD [facility]

[facility] 2021 10:00:00

AM EDT - 2021 10:00:00 AM EDT     Rheumatoid arthritis with rheumatoid fac

tor 

of multiple sites without organ or systems involvement [facility] ([facility])

 

                                        Rheumatoid arthritis with rheumatoid fac

tor of multiple sites without organ or 

systems involvement 

 

                                Attender: Tressa PEREZ [facility] 2021 

12:17:00 PM EDT - 2021 12:17:00 PM EDT                           [facility] (

[facility])

 

                                Attender: Pau MARTINEZ [facility] 2021 

11:34:00 AM EDT - 2021 11:34:00 AM EDT                           [facility] (

[facility])

 

                                Attender: Liana Gleason MD [facility]

[facility] 2021 11:15:00

AM EDT - 2021 11:15:00 AM EDT                           [facility] ([facility])

 

                                Attender: Tressa PEREZ [facility] 2021 

11:15:00 AM EDT - 2021 11:15:00 AM EDT                           [facility] (

[facility])

 

                                Attender: ANGELIA CONNER [facility]

[facility] 2021 

01:20:00 PM EDT - 2021 01:20:00 PM EDT Rheumatoid arthritis with 

rheumatoid factor of multiple sites without organ or systems involvement [facility]

([facility])

 

                                        Rheumatoid arthritis with rheumatoid fac

tor of multiple sites without organ or 

systems involvement 

 

                                Attender: Tressa [facility]EREZ [facility]

[facility] 2021 

09:03:00 AM EDT - 2021 09:03:00 AM EDT                           [facility] (

[facility])

 

                    OFFICE/OUTPATIENT VISIT, ESTOutpatient Attender: Tressa PEREZ 

[facility]        2021 01:20:00 PM EDT - 2021 

01:20:00 PM EDT                         Other long term (current) drug therapyTr

emor, 

unspecifiedRheumatoid arthritis with rheumatoid factor of multiple sites without
organ or systems involvement            [facility] ([facility])

 

                                        Other long term (current) drug therapy 

 

                                        Tremor, unspecified 

 

                                        Rheumatoid arthritis with rheumatoid fac

tor of multiple sites without organ or 

systems involvement 

 

                                Attender: Liana Gleason MD [facility]

[facility] 2021 12:40:00

PM EDT - 2021 12:40:00 PM EDT     Rheumatoid arthritis with rheumatoid fac

tor 

of multiple sites without organ or systems involvement [facility] ([facility])

 

                                        Rheumatoid arthritis with rheumatoid fac

tor of multiple sites without organ or 

systems involvement 

 

                                Attender: MAREK PEREZ [facility] 2021 

03:09:00 PM EDT - 2021 03:09:00 PM EDT                           [facility] (

[facility])

 

                                Attender: Liana Gleason MD [facility]

[facility] 03/10/2021 01:08:00

PM EST - 03/10/2021 01:08:00 PM EST                           [facility] ([facility])

 

                                Attender: ANGELIA CONNER MD [facility]

[facility] 03/10/2021 

11:20:00 AM EST - 03/10/2021 11:20:00 AM EST Rheumatoid arthritis with 

rheumatoid factor of multiple sites without organ or systems involvement [facility]

([facility])

 

                                        Rheumatoid arthritis with rheumatoid fac

tor of multiple sites without organ or 

systems involvement 

 

                                Attender: Liana Gleason MD [facility]

[facility] 2021 03:02:00

PM EST - 2021 03:02:00 PM EST                           [facility] ([facility])

 

                                Attender: [facility] Gleason [facility]

AM EST - 2021 10:33:00 AM EST                           [facility] ([facility])

 

                                Attender: Pau ALVAREZ [facility] 2021 

10:33:00 AM EST - 2021 10:33:00 AM EST                           [facility] (

[facility])

 

                                Attender: Liana Gleason MD [facility] 10[facility]00

AM EST - 2021 10:40:00 AM EST     Rheumatoid arthritis with rheumatoid fac

tor 

of multiple sites without organ or systems involvement [facility] ([facility]

 

                                        Rheumatoid arthritis with rheumatoid fac

tor of multiple sites without organ or 

systems involvement 

 

                                Attender: MAREK PEREZ [facility]

[facility] 2021 

02:27:00 PM EST - 2021 02:27:00 PM EST                           [facility] (

[facility])

 

                Outpatient      Attender: ADRIANE MARTELL MD [place] [facility]  12:00:00 PM 

EST                                                 [facility]

 

                    OFFICE/OUTPATIENT VISIT, ESTOutpatient Attender: MAREK PEREZ [facility]    2021 11:16:00 AM EST - 2021 11:16:00 AM ES

T 

Other long term (current) drug therapyRheumatoid arthritis with rheumatoid 
factor of multiple sites without organ or systems involvement [facility] ([facility]

 

                                        Other long term (current) drug therapy 

 

                                        Rheumatoid arthritis with rheumatoid fac

tor of multiple sites without organ or 

systems involvement 

 

                                Attender: Liana Gleason MD [facility] 11:[facility]00

AM EST - 2021 11:40:00 AM EST     Rheumatoid arthritis with rheumatoid fac

tor 

of multiple sites without organ or systems involvement [facility] ([facility])

 

                                        Rheumatoid arthritis with rheumatoid fac

tor of multiple sites without organ or 

systems involvement 

 

                                Attender: Pau Funk [facility] 2021 

10:38:00 AM EST - 2021 10:38:00 AM EST                           [facility] (

[facility])

 

                                Attender: Pau Funk [facility] 2020 

09:31:00 AM EST - 2020 09:31:00 AM EST                           [facility] (

[facility]

 

                                Attender: Liana Gleason MD [facility]

[facility] 10/29/2020 12:00:00

PM EDT - 10/29/2020 12:00:00 PM EDT     Rheumatoid arthritis with rheumatoid fac

tor 

of multiple sites without organ or systems involvement [facility] ([facility]

 

                                        Rheumatoid arthritis with rheumatoid fac

tor of multiple sites without organ or 

systems involvement 

 

                                Attender: Pau Funk [facility] 10/13/2020 

11:40:00 AM EDT - 10/13/2020 11:40:00 AM EDT Tremor, unspecifiedRheumatoid 

arthritis with rheumatoid factor of multiple sites without organ or systems 
involvementOther long term (current) drug therapyDorsalgia, unspecified [facility] 

([facility])

 

                                        Tremor, unspecified 

 

                                        Rheumatoid arthritis with rheumatoid fac

tor of multiple sites without organ or 

systems involvement 

 

                                        Other long term (current) drug therapy 

 

                                        Dorsalgia, unspecified 

 

                                Attender: ANGELIA CONNER MD [facility] 2020 

01:00:00 PM EDT - 2020 01:00:00 PM EDT Rheu arthritis w rheu factor mult 

site w/o org/sys involv                 [facility] ([facility])

 

                                        Rheu arthritis w rheu factor mult site w

/o org/sys involv 



                                                                                
                                                                                
                                                                                
                                                                                
              



Immunizations

          



             Vaccine      Date         Status       Description  Data Source(s)

 

                Covid 19 Moderna Vaccine 2021 12:00:00 AM EDT completed   

    Covid 19 Moderna 

Vaccine                                 [facility] ([facility])

 

                                        Source: Other Provider 

 

             COVID-19 VACCINE Moderna 2021 12:00:00 AM EDT completed      

           [facility]

 

                                        Vaccine Series Complete: YESThis Data wa

s Submitted to [facility] Via [employer]. 

 

                Covid 19 Moderna Vaccine 2021 12:00:00 AM EST completed   

    Covid 19 Moderna 

Vaccine                                 [facility] ([facility])

 

                                        Source: Other Provider 

 

             COVID-19 VACCINE Moderna 2021 12:00:00 AM EST completed      

           [facility]

 

                                        Vaccine Series Complete: NOThis Data was

 Submitted to [facility] Via [employer]. 

 

             pneumococcal polysaccharide PPV23 2021 11:56:00 AM EST comple

lucie ALDRICH 

([facility]

 

                          Influenza, injectable, MDCK, preservative free, brittanie

valent 2020 12:00:00

AM EST              completed           Flucelvax [id number] [facility] ([facility])

 

                                        Note: approx ; Source: Other Provider 



                                                                                
                                                         



Medications

          



          Medication Brand Name Start Date Product Form Dose      Route     Admi

nistrative 

Instructions Pharmacy Instructions Status     Indications Reaction   Description

 Data 

Source(s)

 

          5 mg                2021 12:00:00 AM EDT tablet    30           

       TAKE ONE TABLET BY MOUTH EVERY DAY

           TAKE ONE TABLET BY MOUTH EVERY DAY SOLD: 2021                  

                [facility]

 

          5 mg                2021 12:00:00 AM EDT tablet    30           

       TAKE ONE TABLET BY MOUTH EVERY DAY

           TAKE ONE TABLET BY MOUTH EVERY DAY SOLD: 2021                  

                [facility]

 

          5 mg                2021 12:00:00 AM EDT tablet    30           

       TAKE ONE TABLET BY MOUTH EVERY DAY

           TAKE ONE TABLET BY MOUTH EVERY DAY SOLD: 10/07/2021                  

                [facility]

 

          5 mg                2021 12:00:00 AM EDT tablet    30           

       TAKE ONE TABLET BY MOUTH EVERY DAY

           TAKE ONE TABLET BY MOUTH EVERY DAY SOLD: 2021                  

                [facility]

 

          50 mg               2021 12:00:00 AM EDT tablet    90           

       TAKE ONE TABLET BY MOUTH EVERY 

DAY        TAKE ONE TABLET BY MOUTH EVERY DAY SOLD: 2021                  

                [facility]

 

          50 mg               2021 12:00:00 AM EDT tablet    90           

       TAKE ONE TABLET BY MOUTH EVERY 

DAY        TAKE ONE TABLET BY MOUTH EVERY DAY SOLD: 2021                  

                [facility]

 

                          Sulfasalazine 500 MG Oral Tablet sulfasalazine 500 mg 

tablet sulfasalazine 500 

mg tablet 2021 12:00:00 AM EDT        2 {tbl} ORAL                 active 

              take 2 Tablet

by oral route 2 times every day after meals [facility]

 

                                        Hydroxychloroquine Sulfate 200 MG Oral T

ablet HYDROXYCHLOROQUINE  200MG TABLET  

TB           HYDROXYCHLOROQUINE  200MG TABLET  TB 2021 12:00:00 AM EDT    

          1 {tbl}      

ORAL                          active                        TAKE 1 TABLET BY VIGNESH

TH  TWICE DAILY [facility] ([facility]

 

                    Folic Acid 1 MG Oral Tablet folic acid 1 mg tablet folic aci

d 1 mg tablet 

2021 12:00:00 AM EDT         1 {tbl} ORAL                    active       

           take 1 Tablet by oral 

route  every day                        [facility]

 

          50 mg               2021 12:00:00 AM EDT tablet    90           

       TAKE ONE TABLET BY MOUTH THREE 

TIMES A DAY AS NEEDED FOR PAIN MAXIMUM DAILY DOSE = 3 TABLETS TAKE ONE TABLET BY

MOUTH THREE TIMES A DAY AS NEEDED FOR PAIN MAXIMUM DAILY DOSE = 3 TABLETS SOLD: 

2021                                                      [facility]

 

          50 mg               2021 12:00:00 AM EDT tablet    90           

       TAKE ONE TABLET BY MOUTH THREE 

TIMES A DAY AS NEEDED FOR PAIN MAXIMUM DAILY DOSE = 3 TABLETS TAKE ONE TABLET BY

MOUTH THREE TIMES A DAY AS NEEDED FOR PAIN MAXIMUM DAILY DOSE = 3 TABLETS SOLD: 

2021                                                      [facility]

 

          50 mg               2021 12:00:00 AM EDT tablet    90           

       TAKE ONE TABLET BY MOUTH THREE 

TIMES A DAY AS NEEDED FOR PAIN MAXIMUM DAILY DOSE = 3 TABLETS TAKE ONE TABLET BY

MOUTH THREE TIMES A DAY AS NEEDED FOR PAIN MAXIMUM DAILY DOSE = 3 TABLETS SOLD: 

2021                                                      [facility]

 

          50 mg               2021 12:00:00 AM EDT tablet    90           

       TAKE ONE TABLET BY MOUTH THREE 

TIMES A DAY AS NEEDED FOR PAIN MAXIMUM DAILY DOSE = 3 TABLETS TAKE ONE TABLET BY

MOUTH THREE TIMES A DAY AS NEEDED FOR PAIN MAXIMUM DAILY DOSE = 3 TABLETS SOLD: 

10/07/2021                                                      [facility]

 

          50 mg               2021 12:00:00 AM EDT tablet    90           

       TAKE ONE TABLET BY MOUTH THREE 

TIMES A DAY AS NEEDED FOR PAIN MAXIMUM DAILY DOSE = 3 TABLETS TAKE ONE TABLET BY

MOUTH THREE TIMES A DAY AS NEEDED FOR PAIN MAXIMUM DAILY DOSE = 3 TABLETS SOLD: 

2021                                                      [facility]

 

                    leflunomide 20 MG Oral Tablet leflunomide 20 mg tablet leflu

nomide 20 mg tablet 

2021 12:00:00 AM EDT                                    active            

   TAKE 1 TABLET BY MOUTH  DAILY 

[facility] ([facility]

 

                    Folic Acid 1 MG Oral Tablet FOLIC ACID TAB 1MG FOLIC ACID TA

B 1MG  03/10/2021 

12:00:00 AM EST        1 {tbl} ORAL                 completed               TAKE

 1 TABLET BY MOUTH  DAILY 

[facility] ([facility])

 

                    leflunomide 20 MG Oral Tablet LEFLUNOMIDE  20MG  TAB LEFLUNO

MIDE  20MG  TAB 

03/10/2021 12:00:00 AM EST                                         completed    

             TAKE 1 TABLET BY MOUTH  DAILY

                                        [facility] ([facility])

 

                          Sulfasalazine 500 MG Oral Tablet SULFASALAZINE  500MG 

 TAB SULFASALAZINE  500MG 

TAB    03/10/2021 12:00:00 AM EST        2 {tbl} ORAL                 completed 

              TAKE 2 TABLETS 

BY MOUTH  TWICE DAILY AFTER MEALS       [facility] ([facility])

 

          50 mg               2021 12:00:00 AM EST tablet    90           

       TAKE ONE TABLET BY MOUTH THREE 

TIMES A DAY AS NEEDED FOR PAIN . MAXIMUM DAILY DOSE = 3 TAKE ONE TABLET BY MOUTH

THREE TIMES A DAY AS NEEDED FOR PAIN . MAXIMUM DAILY DOSE = 3 SOLD: 2021  

         

                                                            [facility]

 

          5 mg                2021 12:00:00 AM EST tablet    30           

       TAKE ONE TABLET BY MOUTH EVERY DAY

           TAKE ONE TABLET BY MOUTH EVERY DAY SOLD: 2021                  

                [facility]

 

          5 mg                2021 12:00:00 AM EST tablet    30           

       TAKE ONE TABLET BY MOUTH EVERY DAY

           TAKE ONE TABLET BY MOUTH EVERY DAY SOLD: 2021                  

                [facility]

 

          5 mg                2021 12:00:00 AM EST tablet    30           

       TAKE ONE TABLET BY MOUTH EVERY DAY

           TAKE ONE TABLET BY MOUTH EVERY DAY SOLD: 2021                  

                [facility]

 

          5 mg                2021 12:00:00 AM EST tablet    30           

       TAKE ONE TABLET BY MOUTH EVERY DAY

           TAKE ONE TABLET BY MOUTH EVERY DAY SOLD: 2021                  

                [facility]

 

                    Folic Acid 1 MG Oral Tablet folic acid 1 mg tablet folic aci

d 1 mg tablet 

2021 12:00:00 AM EST         1 {tbl} ORAL                    completed    

             take 1 Tablet by 

oral route  every day                   [facility] ([facility]

 

                          Sulfasalazine 500 MG Oral Tablet sulfasalazine 500 mg 

tablet sulfasalazine 500 

mg tablet 2021 12:00:00 AM EST        2 {tbl} ORAL                 complet

ed               take 2 

Tablet by oral route 2 times every day after meals [facility]

 

                    leflunomide 20 MG Oral Tablet leflunomide 20 mg tablet leflu

nomide 20 mg tablet 

2021 12:00:00 AM EST                                         completed    

             TAKE 1 TABLET BY MOUTH  EVERY

DAY                                     [facility] ([facility]

 

          50 mg               2020 12:00:00 AM EST tablet    90           

       TAKE ONE TABLET BY MOUTH EVERY 

DAY        TAKE ONE TABLET BY MOUTH EVERY DAY SOLD: 2021                  

                [facility]

 

          50 mg               2020 12:00:00 AM EST tablet    90           

       TAKE ONE TABLET BY MOUTH EVERY 

DAY        TAKE ONE TABLET BY MOUTH EVERY DAY SOLD: 2020                  

                [facility]

 

        Sertraline 50 MG Oral Tablet Sertraline HCL 2020 12:00:00 AM EST  

                                

             active                                              [facility] ([facility]

 

          500 mg              10/14/2020 12:00:00 AM EDT tablet    360          

       TAKE TWO TABLETS BY MOUTH TWICE

A DAY AFTER MEALS         TAKE TWO TABLETS BY MOUTH TWICE A DAY AFTER MEALS SOLD

: 

10/16/2020                                                      [facility]

 

                    Folic Acid 1 MG Oral Tablet folic acid 1 mg tablet folic aci

d 1 mg tablet 

10/13/2020 12:00:00 AM EDT         1 {tbl} ORAL                    completed    

             take 1 Tablet by 

oral route  every day                   [facility]

 

                    leflunomide 20 MG Oral Tablet leflunomide 20 mg tablet leflu

nomide 20 mg tablet 

10/13/2020 12:00:00 AM EDT                                         completed    

             TAKE 1 TABLET BY MOUTH  EVERY

DAY                                     [facility] ([facility]

 

                          Hydroxychloroquine Sulfate 200 MG Oral Tablet hydroxyc

hloroquine 200 mg tablet 

hydroxychloroquine 200 mg tablet 10/13/2020 12:00:00 AM EDT           1 {tbl}   

ORAL                          

completed                                       take 1 Tablet by oral route 2 ti

mes every day [facility]

 

                          Sulfasalazine 500 MG Oral Tablet sulfasalazine 500 mg 

tablet sulfasalazine 500 

mg tablet 10/13/2020 12:00:00 AM EDT        2 {tbl} ORAL                 complet

ed               take 2 

Tablet by oral route 2 times every day after meals [facility]

 

          5 mg                2020 12:00:00 AM EDT tablet    30           

       TAKE ONE TABLET BY MOUTH EVERY DAY

           TAKE ONE TABLET BY MOUTH EVERY DAY SOLD: 2020                  

                [facility]

 

          5 mg                2020 12:00:00 AM EDT tablet    30           

       TAKE ONE TABLET BY MOUTH EVERY DAY

           TAKE ONE TABLET BY MOUTH EVERY DAY SOLD: 2021                  

                [facility]

 

          5 mg                2020 12:00:00 AM EDT tablet    30           

       TAKE ONE TABLET BY MOUTH EVERY DAY

           TAKE ONE TABLET BY MOUTH EVERY DAY SOLD: 2020                  

                [facility]

 

          5 mg                2020 12:00:00 AM EDT tablet    30           

       TAKE ONE TABLET BY MOUTH EVERY DAY

           TAKE ONE TABLET BY MOUTH EVERY DAY SOLD: 10/02/2020                  

                [facility]

 

          50 mg               2020 12:00:00 AM EDT tablet    90           

       TAKE 1 TAB.BY MOUTH 3 TIMES A DAY

AS NEEDED FOR PAIN MAX DAILY DOSE=3 TABLETS TAKE 1 TAB.BY MOUTH 3 TIMES A DAY AS

NEEDED FOR PAIN MAX DAILY DOSE=3 TABLETS SOLD: 10/05/2020                       

                 [facility]

 

          50 mg               2020 12:00:00 AM EDT tablet    90           

       TAKE 1 TAB.BY MOUTH 3 TIMES A DAY

AS NEEDED FOR PAIN MAX DAILY DOSE=3 TABLETS TAKE 1 TAB.BY MOUTH 3 TIMES A DAY AS

NEEDED FOR PAIN MAX DAILY DOSE=3 TABLETS SOLD: 2020                       

                 [facility]

 

                          Dexamethasone 4 MG Oral Tablet dexamethasone 4 mg tabl

et dexamethasone 4 mg 

tablet                                           completed               take 2.

5 tablet by oral route 3 times every day

                                        [facility] ([facility])



                                                                                
                                                                                
                                                                                
                                                                                
                                                                                
                                                               



Insurance Providers

          



             Payer name   Policy type / Coverage type Policy ID    Covered party

 ID Covered 

party's relationship to cortés Policy Cortés             Plan Information

 

          MEDICARE COMPLETE           [id number]                             94

[id number]

 

          MEDICARE COMPLETE           [id number]                             [id number]

[id number]

 

          [employer]HC UNITED MEDICARE COMPLETE G         [id number]           Self     

           [id number]

 

          [employer]HC UNITED MEDICARE COMPLETE G         [id number]           Self       

         [id number]

 

                [facility] Medicare Commercial      [id number]     

[id number] Self                                    

[id number]

 

          MEDICARE COMPLETE           [id number]                             

[id number]

 

          [employer]           [id number]                             [id number]

[id number]

 

          [employer]           [id number]                             

[id number]

 

                [employer] Medicare Commercial      [id number]     

[id number] Self                                    

[id number]

 

          MEDICARE COMPLETE           [id number]                             94

[id number]

 

                [employer] Medicare Commercial      [id number]     

[id number] Self                                    

[id number]



                                                                                
                                                                                
                          



Problems, Conditions, and Diagnoses

          No Information                                                        
                               



Surgeries/Procedures

          



             Procedure    Description  Date         Indications  Data Source(s)

 

             OFFICE OUTPATIENT VISIT 25 MINUTES              2021 12:00:00

 AM EDT              [facility]

 

                    Normal saline solution infus                     2021 

12:00:00 AM EDT - 2021 12:00:00 

AM EDT                                              [facility]

 

                    Actemra (tocilizumab 1 Mg)                     2021 12

:00:00 AM EDT - 2021 12:00:00 AM

EDT                                                 [facility]

 

                    CHEMO, IV INFUSION, 1 HR                     2021 12:0

0:00 AM EDT - 2021 12:00:00 AM 

EDT                                                 [facility]

 

                    Normal saline solution infus                     2021 

12:00:00 AM EDT - 2021 12:00:00 

AM EDT                                              [facility]

 

                    Actemra (tocilizumab 1 Mg)                     2021 12

:00:00 AM EDT - 2021 12:00:00 AM

EDT                                                 [facility]

 

                    CHEMO, IV INFUSION, 1 HR                     2021 12:0

0:00 AM EDT - 2021 12:00:00 AM 

EDT                                                 [facility]

 

                    HCV Screening For Pt Born 1945 To 1965                     0

2021 12:00:00 AM EDT - 2021

12:00:00 AM EDT                                     [facility]

 

                    OFFICE/OUTPATIENT VISIT, EST                     2021 

12:00:00 AM EDT - 2021 12:00:00 

AM EDT                                              [facility]

 

                    ASSAY OF SERUM ALBUMIN                     2021 12:00:

00 AM EDT - 2021 12:00:00 AM EDT

                                                    [facility]

[facility]

 

                    ALANINE AMINO (ALT) (SGPT)                     2021 12

:00:00 AM EDT - 2021 12:00:00 AM

EDT                                                 [facility]

[facility]

 

                    TRANSFERASE (AST) (SGOT)                     2021 12:0

0:00 AM EDT - 2021 12:00:00 AM 

EDT                                                 [facility]

[facility]

 

                    ASSAY OF UREA NITROGEN                     2021 12:00:

00 AM EDT - 2021 12:00:00 AM EDT

                                                    [facility]

[facility])

 

             ASSAY OF CREATININE              2021 12:00:00 AM EDT -  12:00:00 AM EDT              

[facility] ([facility])

 

             C-REACTIVE PROTEIN              2021 12:00:00 AM EDT - 2021 12:00:00 AM EDT              

[facility] ([facility])

 

                    RBC SED RATE, AUTOMATED                     2021 12:00

:00 AM EDT - 2021 12:00:00 AM 

EDT                                                 [facility]

 

                    COMPLETE CBC W/AUTO DIFF WBC                     2021 

12:00:00 AM EDT - 2021 12:00:00 

AM EDT                                              [facility]

[facility]

 

                ROUTINE VENIPUNCTURE                 2021 12:00:00 AM EDT 

- 2021 12:00:00 AM EDT  

                                        [facility] ([facility]

 

                    Normal saline solution infus                     2021 

12:00:00 AM EDT - 2021 12:00:00 

AM EDT                                              [facility]

[facility]

 

                    Actemra (tocilizumab 1 Mg)                     2021 12

:00:00 AM EDT - 2021 12:00:00 AM

EDT                                                 [facility]

[facility]

 

                    CHEMO, IV INFUSION, 1 HR                     2021 12:0

0:00 AM EDT - 2021 12:00:00 AM 

EDT                                                 [facility]

 

                    Normal saline solution infus                     03/10/2021 

12:00:00 AM EST - 03/10/2021 12:00:00 

AM EST                                              [facility]

 

                    Actemra (tocilizumab 1 Mg)                     03/10/2021 12

:00:00 AM EST - 03/10/2021 12:00:00 AM

EST                                                 [facility]

 

                    CHEMO, IV INFUSION, 1 HR                     03/10/2021 12:0

0:00 AM EST - 03/10/2021 12:00:00 AM 

EST                                                 [facility]

 

                    Normal saline solution infus                     2021 

12:00:00 AM EST - 2021 12:00:00 

AM EST                                              [facility]

 

                    Actemra (tocilizumab 1 Mg)                     2021 12

:00:00 AM EST - 2021 12:00:00 AM

EST                                                 [facility]

 

                    CHEMO, IV INFUSION, 1 HR                     2021 12:0

0:00 AM EST - 2021 12:00:00 AM 

EST                                                 [facility]

 

                    HCV Screening For Pt Born 1945 To 1965                     0

2021 12:00:00 AM EST - 2021

12:00:00 AM EST                                     [facility]

 

                    OFFICE/OUTPATIENT VISIT, EST                     2021 

12:00:00 AM EST - 2021 12:00:00 

AM EST                                              [facility]

 

                    Actemra (tocilizumab 1 Mg)                     2021 12

:00:00 AM EST - 2021 12:00:00 AM

EST                                                 [facility]

 

                    Normal saline solution infus                     2021 

12:00:00 AM EST - 2021 12:00:00 

AM EST                                              [facility]

 

                    CHEMO, IV INFUSION, 1 HR                     2021 12:0

0:00 AM EST - 2021 12:00:00 AM 

EST                                                 [facility]

 

                    ASSAY OF SERUM ALBUMIN                     2021 12:00:

00 AM EST - 2021 12:00:00 AM EST

                                                    [facility]

 

                    TRANSFERASE (AST) (SGOT)                     2021 12:0

0:00 AM EST - 2021 12:00:00 AM 

EST                                                 [facility]

 

                    ALANINE AMINO (ALT) (SGPT)                     2021 12

:00:00 AM EST - 2021 12:00:00 AM

EST                                                 [facility]

 

             ASSAY OF CREATININE              2021 12:00:00 AM EST -  12:00:00 AM EST              

[facility]

 

             C-REACTIVE PROTEIN              2021 12:00:00 AM EST - 2021 12:00:00 AM EST              

[facility]

 

                    RBC SED RATE, AUTOMATED                     2021 12:00

:00 AM EST - 2021 12:00:00 AM 

EST                                                 [facility] [facility]

[facility]

 

                    COMPLETE CBC W/AUTO DIFF WBC                     2021 

12:00:00 AM EST - 2021 12:00:00 

AM EST                                              [facility] ([facility]

[facility])

 

                ROUTINE VENIPUNCTURE                 2021 12:00:00 AM EST 

- 2021 12:00:00 AM EST  

                                        [facility] ([facility])



                                                                                
                                                                                
                                                                                
                                                                                
                                                                                
                                                                         



Results

          



                    ID                  Date                Data Source

 

                    [id number] 2021 11:54:00 AM 

EDT [facility] 

([facility])









          Name      Value     Range     Interpretation Code Description Data Priscilla

rce(s) Supporting 

Document(s)

 

                    <0.2      0.0-0.5             CRP       [facility] ([facility])  









                    ID                  Date                Data Source

 

                    [id number] 2021 11:54:00 AM 

EDT [facility] 

([facility])









          Name      Value     Range     Interpretation Code Description Data Priscilla

rce(s) Supporting 

Document(s)

 

                    1.0 mg/dL 0.9-1.2             CREATININE [facility] ([facility])  

 

                    >60                           eGFR Non- [facility]

[facility] ([facility])  

 

                    >60                           eGFR  [facility] 

([facility])  









                    ID                  Date                Data Source

 

                    [id number] 2021 11:54:00 AM 

EDT [facility] 

([facility])









          Name      Value     Range     Interpretation Code Description Data Priscilla

rce(s) Supporting 

Document(s)

 

                    10 mg/dL  10-23               BUN       [facility]  









                    ID                  Date                Data Source

 

                    [id number] 2021 11:54:00 AM 

EDT [facility] 

([facility])









          Name      Value     Range     Interpretation Code Description Data Priscilla

rce(s) Supporting 

Document(s)

 

                    18 U/L    15-37               AST       [facility] ([facility])  









                    ID                  Date                Data Source

 

                    [id number] 2021 11:54:00 AM 

EDT [facility] 

([facility])









          Name      Value     Range     Interpretation Code Description Data Priscilla

rce(s) Supporting 

Document(s)

 

                    41 U/L    30-65               ALT       [facility] ([facility])  









                    ID                  Date                Data Source

 

                    [id number] 2021 11:54:00 AM 

EDT [facility] 

([facility])









          Name      Value     Range     Interpretation Code Description Data Priscilla

rce(s) Supporting 

Document(s)

 

                    4.1 g/dL  3.4-4.4             ALB       [facility] ([facility])  









                    ID                  Date                Data Source

 

                    [id number] 2021 11:54:00 AM 

EDT [facility] 

([facility])









          Name      Value     Range     Interpretation Code Description Data Priscilla

rce(s) Supporting 

Document(s)

 

                    2 mm/Hr   0-20                ESR       [facility]  









                    ID                  Date                Data Source

 

                    [id number] 2021 11:54:00 AM 

EDT [facility] 

([facility])









          Name      Value     Range     Interpretation Code Description Data Priscilla

rce(s) Supporting 

Document(s)

 

                    7.9 10*3/uL 3.7-10.1            WBC       [facility] ([facility])  

 

                    16.8 g/dL 13.0-18.0           HGB       [facility]  

 

                    5.30 10*6/uL 4.00-5.90           RBC       [facility]  

 

                    98.4 fL   70.0-100.0           MCV       [facility] ([facility]  

 

                    52.2 %    39.0-55.0           HCT       [facility]  

 

                    31.8 pg   26.0-34.0           MCH       [facility]  

 

                    32.3 g/dL 31.0-37.0           MCHC      [facility]  

 

                    13.3 %    10.0-15.0           RDW       [facility]  

 

                    211 10*3/uL 150-500             PLATELETS [facility] ([facility])  

 

                    3.74 10*3/uL 2.10-8.00           MICHELE#      [facility]  

 

                    >10       6.0-10.0  Above high normal MPV       [facility]  

 

                      1.51 10*3/uL 0.10-1.00  Above high normal MONO#      [facility]

en ([facility])                              

 

                    1.63 10*3/uL 1.00-5.00           LYM#      [facility]  

 

                      0.8 10*3/uL 0.0-0.5    Above high normal EOS#       [facility]

n ([facility])                              

 

                    0.2 10*3/uL 0.0-0.2             BASO#     [facility] ([facility]  

 

                    47.7 %    50.0-80.0 Below low normal MICHELE%      [facility]  

 

                    20.8 %    25.0-50.0 Below low normal LYM%      [facility]  

 

                    9.7 %     0.0-5.0   Above high normal EOS%      [facility]  

 

                    19.2 %    2.0-10.0  Above high normal MONO%     [facility] 



 

                    2.7 %     0.0-4.0             BASO%     [facility]  









                    ID                  Date                Data Source

 

                    [id number] 2021 01:10:00 PM 

EST [facility] 

([facility])









          Name      Value     Range     Interpretation Code Description Data Priscilla

rce(s) Supporting 

Document(s)

 

                    0.4 mg/dL 0.0-0.5             CRP       [facility] ([facility])  









                    ID                  Date                Data Source

 

                    [id number] 2021 01:10:00 PM 

EST [facility] 

([facility])









          Name      Value     Range     Interpretation Code Description Data Priscilla

rce(s) Supporting 

Document(s)

 

                    1.0 mg/dL 0.9-1.2             CREATININE [facility] ([facility])  

 

                    >60                           eGFR Non- Next

Gen ([facility])  

 

                    >60                           eGFR  [facility] 

([facility])  









                    ID                  Date                Data Source

 

                    [id number] 2021 01:10:00 PM 

EST [facility] 

([facility])









          Name      Value     Range     Interpretation Code Description Data Priscilla

rce(s) Supporting 

Document(s)

 

                    19 U/L    15-37               AST       [facility] ([facility])  









                    ID                  Date                Data Source

 

                    [id number] 2021 01:10:00 PM 

EST [facility] 

([facility] [facility])









          Name      Value     Range     Interpretation Code Description Data Priscilla

rce(s) Supporting 

Document(s)

 

                    27 U/L    30-65     Below low normal ALT       [facility] ([facility])  









                    ID                  Date                Data Source

 

                    [id number] 2021 01:10:00 PM 

EST [facility] 

([facility])









          Name      Value     Range     Interpretation Code Description Data Priscilla

rce(s) Supporting 

Document(s)

 

                    3.3 g/dL  3.4-4.4   Below low normal ALB       [facility] ([facility])  









                    ID                  Date                Data Source

 

                    [id number] 2021 01:10:00 PM 

EST [facility] 

([facility])









          Name      Value     Range     Interpretation Code Description Data Priscilla

rce(s) Supporting 

Document(s)

 

                    18 mm/Hr  0-20                ESR       [facility]  









                    ID                  Date                Data Source

 

                    [id number] 2021 01:10:00 PM 

EST [facility] 

([facility])









          Name      Value     Range     Interpretation Code Description Data Priscilla

rce(s) Supporting 

Document(s)

 

                    8.2 10*3/uL 3.7-10.1            WBC       [facility] ([facility]  

 

                    4.64 10*6/uL 4.00-5.90           RBC       [facility]  

 

                    14.6 g/dL 13.0-18.0           HGB       [facility]  

 

                    45.0 %    39.0-55.0           HCT       [facility]  

 

                    96.9 fL   70.0-100.0           MCV       [facility] ([facility]  

 

                    31.5 pg   26.0-34.0           MCH       [facility]  

 

                    11.7 %    10.0-15.0           RDW       [facility]  

 

                    32.5 g/dL 31.0-37.0           MCHC      [facility]  

 

                    415 10*3/uL 150-500             PLATELETS [facility] ([facility])  

 

                    7.1 fL    6.0-10.0            MPV       [facility]  

 

                    1.05 10*3/uL 1.00-5.00           LYM#      [facility]  

 

                    6.07 10*3/uL 2.10-8.00           MICHELE#      [facility]  

 

                    0.1 10*3/uL 0.0-0.5             EOS#      [facility] ([facility]  

 

                    0.78 10*3/uL 0.10-1.00           MONO#     [facility]  

 

                    0.2 10*3/uL 0.0-0.2             BASO#     [facility] ([facility]  

 

                    12.8 %    25.0-50.0 Below low normal LYM%      [facility]  

 

                    73.8 %    50.0-80.0           MICHELE%      [facility] ([facility])  

 

                    1.8 %     0.0-5.0             EOS%      [facility] ([facility])  

 

                    9.5 %     2.0-10.0            MONO%     [facility] ([facility])  

 

                    2.1 %     0.0-4.0             BASO%     [facility] ([facility])  







                                        Procedure

 

                                          



                                                                                
                                                                                
                                                                            



Social History

          



           Code       Duration   Value      Status     Description Data Source(s

)

 

           Caffeine Use Details 2021 12:00:00 AM EDT            completed 

  and coffee, 1 cup 

[facility] ([facility])

 

             Smoking      2021 12:00:00 AM EDT Unknown if ever smoked comp

leted    Unknown if 

ever smoked                             [facility] ([facility])

 

                      2021 12:00:00 AM EDT Chews tobacco completed  Chews 

tobacco [facility] 

([facility])



                                                                                
                                     



Vital Signs

          



                    ID                  Date                Data Source

 

                    UNK                                      









           Name       Value      Range      Interpretation Code Description Data

 Source(s)

 

           Body mass index (BMI) [Ratio] 31.6 kg/m2                       31.6 k

g/m2 [facility] ([facility]

 

           Systolic blood pressure 144 mm[Hg]                       144 mm[Hg] M

[facility] ([facility]

 

           Diastolic blood pressure 84 mm[Hg]                        84 mm[Hg]  

[facility]

 

           Body temperature 98.6 [degF]                       98.6 [degF] [facility]

 ([facility]

 

           Heart rate 66 /min                          66 /min    [facility] ([facility]

 

           Respiratory rate 14 /min                          14 /min    [facility] (

[facility]

 

           Body height 68 [in_i]                        68 [in_i]  [facility] ([facility]

 

                                        5'8" 

 

           Body weight 208.00 [lb_av]                       208.00 [lb_av] [facility]

T ([facility]

 

           Ideal body weight 154 [lb_av]                       154 [lb_av] [facility]

T ([facility]

 

           Oxygen saturation in Arterial blood by Pulse oximetry 95 %           

                  95 %       [facility] 

([facility]

 

           Systolic blood pressure 122 mm[Hg]                       122 mm[Hg] N

[facility] ([facility]

 

           Diastolic blood pressure 82 mm[Hg]                        82 mm[Hg]  

[facility] ([facility]

 

           Body weight 94.801 kg                        94.801 kg  [facility]

 

           Systolic blood pressure 130 mm[Hg]                       130 mm[Hg] N

[facility] ([facility]

 

           Diastolic blood pressure 90 mm[Hg]                        90 mm[Hg]  

[facility] ([facility]

 

           Heart rate 74 /min                          74 /min    [facility]

 

           Body temperature 36.39 Amaris                        36.39 Amaris  [facility] 

([facility])

 

           Respiratory rate 18 /min                          18 /min    [facility] 

([facility])

 

           Systolic blood pressure 124 mm[Hg]                       124 mm[Hg] N

[facility] ([facility]

 

           Diastolic blood pressure 72 mm[Hg]                        72 mm[Hg]  

[facility] ([facility]

 

           Heart rate 72 /min                          72 /min    [facility]

 

           Respiratory rate 14 /min                          14 /min    [facility] 

([facility])

 

           Body height 172.72 cm                        172.72 cm  [facility]

 

           Body weight 95.254 kg                        95.254 kg  [facility]

 

           Systolic blood pressure 136 mm[Hg]                       136 mm[Hg] N

[facility] ([facility]

 

           Diastolic blood pressure 84 mm[Hg]                        84 mm[Hg]  

[facility] ([facility]

 

           Heart rate 84 /min                          84 /min    [facility]

 

           Body temperature 36.39 Amaris                        36.39 Amaris  [facility] 

([facility])

 

           Respiratory rate 17 /min                          17 /min    [facility] 

([facility])

 

           Body mass index (BMI) [Ratio] 31.93 kg/m2            Overweight 31.93

 kg/m2 [facility] 

([facility])

 

           Body height 172.72 cm                        172.72 cm  [facility]

 

           Body weight 96.162 kg                        96.162 kg  [facility]

 

           Systolic blood pressure 140 mm[Hg]                       140 mm[Hg] N

[facility] ([facility]

 

           Diastolic blood pressure 86 mm[Hg]                        86 mm[Hg]  

[facility] ([facility]

 

           Body mass index (BMI) [Ratio] 32.23 kg/m2            Overweight 32.23

 kg/m2 [facility] 

([facility])

 

           Systolic blood pressure 126 mm[Hg]                       126 mm[Hg] N

[facility] ([facility]

 

           Diastolic blood pressure 80 mm[Hg]                        80 mm[Hg]  

[facility] ([facility]

 

           Body height 172.72 cm                        172.72 cm  [facility]

 

           Body weight 84.822 kg                        84.822 kg  [facility]

 

           Systolic blood pressure 122 mm[Hg]                       122 mm[Hg] N

[facility] ([facility]

 

           Diastolic blood pressure 70 mm[Hg]                        70 mm[Hg]  

[facility] ([facility]

 

           Heart rate 80 /min                          80 /min    [facility]

 

           Body temperature 36.61 Amaris                        36.61 Amaris  [facility] 

([facility])

 

           Respiratory rate 16 /min                          16 /min    [facility] 

([facility])

 

           Body mass index (BMI) [Ratio] 28.43 kg/m2            Overweight 28.43

 kg/m2 [facility] 

([facility])

 

           Systolic blood pressure 142 mm[Hg]                       142 mm[Hg] N

[facility] ([facility]

 

           Diastolic blood pressure 72 mm[Hg]                        72 mm[Hg]  

[facility] ([facility]

 

           Body weight 85.729 kg                        85.729 kg  [facility]

 

           Systolic blood pressure 136 mm[Hg]                       136 mm[Hg] N

[facility] ([facility]

 

           Diastolic blood pressure 82 mm[Hg]                        82 mm[Hg]  

[facility] ([facility]

 

           Heart rate 68 /min                          68 /min    [facility]

 

           Body temperature 36.33 Amaris                        36.33 Amaris  [facility] 

([facility])

 

           Respiratory rate 17 /min                          17 /min    [facility] 

([facility])

 

           Systolic blood pressure 122 mm[Hg]                       122 mm[Hg] N

[facility] ([facility]

 

           Diastolic blood pressure 86 mm[Hg]                        86 mm[Hg]  

[facility] ([facility]

 

           Body height 172.72 cm                        172.72 cm  [facility]

 

           Body weight 92.533 kg                        92.533 kg  [facility]

 

           Systolic blood pressure 124 mm[Hg]                       124 mm[Hg] N

[facility] ([facility]

 

           Diastolic blood pressure 86 mm[Hg]                        86 mm[Hg]  

[facility] ([facility]

 

           Heart rate 76 /min                          76 /min    [facility]

 

           Body temperature 36.39 Amaris                        36.39 Amaris  [facility] 

([facility])

 

           Respiratory rate 16 /min                          16 /min    [facility] 

([facility])

 

           Body mass index (BMI) [Ratio] 31.02 kg/m2            Overweight 31.02

 kg/m2 [facility] 

([facility])

 

           Respiratory rate 14 /min                          14 /min    [facility] (

[facility]

 

           Systolic blood pressure 138 mm[Hg]                       138 mm[Hg] M

[facility] ([facility]

 

           Diastolic blood pressure 88 mm[Hg]                        88 mm[Hg]  

[facility] ([facility]

 

           Heart rate 80 /min                          80 /min    [facility] ([facility]

 

           Body weight 207.00 [lb_av]                       207.00 [lb_av] [facility]

T ([facility]

 

           Systolic blood pressure 118 mm[Hg]                       118 mm[Hg] N

[facility] ([facility]

 

           Diastolic blood pressure 70 mm[Hg]                        70 mm[Hg]  

[facility] ([facility]

 

           Body height 172.72 cm                        172.72 cm  [facility]

 

           Body weight 90.718 kg                        90.718 kg  [facility]

 

           Systolic blood pressure 134 mm[Hg]                       134 mm[Hg] N

[facility] ([facility]

 

           Diastolic blood pressure 88 mm[Hg]                        88 mm[Hg]  

[facility] ([facility]

 

           Heart rate 78 /min                          78 /min    [facility]

 

           Body temperature 36.22 Amaris                        36.22 Amaris  [facility] 

([facility])

 

           Respiratory rate 16 /min                          16 /min    [facility] 

([facility])

 

           Body mass index (BMI) [Ratio] 30.41 kg/m2            Overweight 30.41

 kg/m2 [facility] 

([facility])



                                                                                
                  



Patient Treatment Plan of Care

          



             Planned Activity Planned Date Details      Description  Data Source

(s)

 

             Sulfasalazine 500 MG Oral Tablet 2021 12:00:00 AM EDT        

                   [facility] 

([facility])

 

             Folic Acid 1 MG Oral Tablet 2021 12:00:00 AM EDT             

              [facility] ([facility]

 

             Hydroxychloroquine Sulfate 200 MG Oral Tablet 2021 12:00:00 A

M EDT                           

[facility] ([facility])

 

             leflunomide 20 MG Oral Tablet 2021 12:00:00 AM EDT           

                [facility] ([facility])

 

             Sulfasalazine 500 MG Oral Tablet 03/10/2021 12:00:00 AM EST        

                   [facility] 

([facility])

 

             Folic Acid 1 MG Oral Tablet 03/10/2021 12:00:00 AM EST             

              [facility]

 

             leflunomide 20 MG Oral Tablet 03/10/2021 12:00:00 AM EST           

                [facility]

 

             Sulfasalazine 500 MG Oral Tablet 2021 12:00:00 AM EST        

                   [facility] 

([facility])

 

             Folic Acid 1 MG Oral Tablet 2021 12:00:00 AM EST             

              [facility]

 

             leflunomide 20 MG Oral Tablet 2021 12:00:00 AM EST           

                [facility]

 

             Hydroxychloroquine Sulfate 200 MG Oral Tablet 10/13/2020 12:00:00 A

M EDT                           

[facility] ([facility])

 

             Folic Acid 1 MG Oral Tablet 10/13/2020 12:00:00 AM EDT             

              [facility]

 

             Sulfasalazine 500 MG Oral Tablet 10/13/2020 12:00:00 AM EDT        

                   [facility] 

([facility])

 

             leflunomide 20 MG Oral Tablet 10/13/2020 12:00:00 AM EDT           

                [facility]

 

             Dexamethasone 4 MG Oral Tablet                                     

   [facility] ([facility])

## 2021-11-08 NOTE — REP
INDICATION:

ams.



COMPARISON:

None.



TECHNIQUE:

2 x 2 mm increments using helical technique and reconstructed in both sagittal and

coronal planes



FINDINGS:

There is no evidence of an acute fracture, dislocation, or subluxation.  Vertebral

body height and alignment is within normal limits.  There is no evidence of abnormal

paraspinal soft tissue swelling.



IMPRESSION:

No acute osseous abnormality.





<Electronically signed by Warren Abdul > 11/08/21 [id number]

## 2021-11-08 NOTE — REP
INDICATION:

et tube placement.



COMPARISON:

09/17/2018 AP CXR



TECHNIQUE:

AP supine



FINDINGS:

There is an endotracheal tube about 2.3 cm above the maria alejandra.  Lungs are hypoinflated.

Crowding of markings.  Allowing for that some vascular congestion is suggested with

engorgement of veins but no haziness in either lung field the suggested definite

layering effusion.  Patchy atelectasis or infiltrates in the perihilar regions, right

greater than left.  Heart not enlarged for this degree of inflation..



IMPRESSION:

1. Hypoinflated chest with endotracheal tube 2.3 cm from the maria alejandra.  Crowded markings

and some vascular congestion noted with some perihilar atelectasis bilaterally right

greater than left.  No definite layering effusion in either hemithorax but some venous

congestion without nallely edema suggested.  This may all be improved with better level

of inflation.





<Electronically signed by Zhou Rain > 11/08/21 [id number]

## 2021-12-23 ENCOUNTER — HOSPITAL ENCOUNTER (OUTPATIENT)
Dept: HOSPITAL 53 - M ED | Age: 66
Setting detail: OBSERVATION
LOS: 5 days | Discharge: NURSING, REHAB OR LONG-TERM CARE | End: 2021-12-28
Attending: INTERNAL MEDICINE | Admitting: INTERNAL MEDICINE
Payer: MEDICARE

## 2021-12-23 VITALS — BODY MASS INDEX: 26.76 KG/M2 | HEIGHT: 68 IN | WEIGHT: 176.59 LBS

## 2021-12-23 VITALS — DIASTOLIC BLOOD PRESSURE: 80 MMHG | SYSTOLIC BLOOD PRESSURE: 136 MMHG

## 2021-12-23 DIAGNOSIS — F32.9: ICD-10-CM

## 2021-12-23 DIAGNOSIS — Z79.82: ICD-10-CM

## 2021-12-23 DIAGNOSIS — Z79.52: ICD-10-CM

## 2021-12-23 DIAGNOSIS — I10: ICD-10-CM

## 2021-12-23 DIAGNOSIS — N47.2: ICD-10-CM

## 2021-12-23 DIAGNOSIS — Z79.899: ICD-10-CM

## 2021-12-23 DIAGNOSIS — F03.90: ICD-10-CM

## 2021-12-23 DIAGNOSIS — I69.320: ICD-10-CM

## 2021-12-23 DIAGNOSIS — F41.9: ICD-10-CM

## 2021-12-23 DIAGNOSIS — K21.9: ICD-10-CM

## 2021-12-23 DIAGNOSIS — Z88.0: ICD-10-CM

## 2021-12-23 DIAGNOSIS — G40.89: Primary | ICD-10-CM

## 2021-12-23 DIAGNOSIS — M06.9: ICD-10-CM

## 2021-12-23 LAB
ALBUMIN SERPL BCG-MCNC: 3.1 GM/DL (ref 3.2–5.2)
ALT SERPL W P-5'-P-CCNC: 36 U/L (ref 12–78)
BASOPHILS # BLD AUTO: 0.2 10^3/UL (ref 0–0.2)
BASOPHILS NFR BLD AUTO: 1.6 % (ref 0–1)
BILIRUB CONJ SERPL-MCNC: 0.1 MG/DL (ref 0–0.2)
BILIRUB SERPL-MCNC: 0.4 MG/DL (ref 0.2–1)
BUN SERPL-MCNC: 14 MG/DL (ref 7–18)
CALCIUM SERPL-MCNC: 8.3 MG/DL (ref 8.8–10.2)
CHLORIDE SERPL-SCNC: 111 MEQ/L (ref 98–107)
CO2 SERPL-SCNC: 22 MEQ/L (ref 21–32)
CREAT SERPL-MCNC: 0.99 MG/DL (ref 0.7–1.3)
EOSINOPHIL # BLD AUTO: 0.6 10^3/UL (ref 0–0.5)
EOSINOPHIL NFR BLD AUTO: 6 % (ref 0–3)
GFR SERPL CREATININE-BSD FRML MDRD: > 60 ML/MIN/{1.73_M2} (ref 49–?)
GLUCOSE SERPL-MCNC: 92 MG/DL (ref 70–100)
HCT VFR BLD AUTO: 37.9 % (ref 42–52)
HGB BLD-MCNC: 12.1 G/DL (ref 13.5–17.5)
LYMPHOCYTES # BLD AUTO: 1.5 10^3/UL (ref 1.5–5)
LYMPHOCYTES NFR BLD AUTO: 16.2 % (ref 24–44)
MCH RBC QN AUTO: 31.2 PG (ref 27–33)
MCHC RBC AUTO-ENTMCNC: 31.9 G/DL (ref 32–36.5)
MCV RBC AUTO: 97.7 FL (ref 80–96)
MONOCYTES # BLD AUTO: 1.2 10^3/UL (ref 0–0.8)
MONOCYTES NFR BLD AUTO: 13.2 % (ref 2–8)
NEUTROPHILS # BLD AUTO: 5.6 10^3/UL (ref 1.5–8.5)
NEUTROPHILS NFR BLD AUTO: 59.7 % (ref 36–66)
OSMOLALITY SERPL: 284 MOSM/KG (ref 280–301)
PLATELET # BLD AUTO: 238 10^3/UL (ref 150–450)
POTASSIUM SERPL-SCNC: 4.2 MEQ/L (ref 3.5–5.1)
PROLACTIN SERPL-MCNC: 5.8 NG/ML (ref 2.1–17.7)
PROT SERPL-MCNC: 6.3 GM/DL (ref 6.4–8.2)
RBC # BLD AUTO: 3.88 10^6/UL (ref 4.3–6.1)
SODIUM SERPL-SCNC: 141 MEQ/L (ref 136–145)
TSH SERPL DL<=0.005 MIU/L-ACNC: 0.92 UIU/ML (ref 0.36–3.74)
WBC # BLD AUTO: 9.3 10^3/UL (ref 4–10)

## 2021-12-23 PROCEDURE — 80076 HEPATIC FUNCTION PANEL: CPT

## 2021-12-23 PROCEDURE — 70450 CT HEAD/BRAIN W/O DYE: CPT

## 2021-12-23 PROCEDURE — 76857 US EXAM PELVIC LIMITED: CPT

## 2021-12-23 PROCEDURE — 87493 C DIFF AMPLIFIED PROBE: CPT

## 2021-12-23 PROCEDURE — 76775 US EXAM ABDO BACK WALL LIM: CPT

## 2021-12-23 PROCEDURE — 82550 ASSAY OF CK (CPK): CPT

## 2021-12-23 PROCEDURE — 97112 NEUROMUSCULAR REEDUCATION: CPT

## 2021-12-23 PROCEDURE — 97161 PT EVAL LOW COMPLEX 20 MIN: CPT

## 2021-12-23 PROCEDURE — 36415 COLL VENOUS BLD VENIPUNCTURE: CPT

## 2021-12-23 PROCEDURE — 83930 ASSAY OF BLOOD OSMOLALITY: CPT

## 2021-12-23 PROCEDURE — 80048 BASIC METABOLIC PNL TOTAL CA: CPT

## 2021-12-23 PROCEDURE — 93041 RHYTHM ECG TRACING: CPT

## 2021-12-23 PROCEDURE — 97110 THERAPEUTIC EXERCISES: CPT

## 2021-12-23 PROCEDURE — 71045 X-RAY EXAM CHEST 1 VIEW: CPT

## 2021-12-23 PROCEDURE — 85025 COMPLETE CBC W/AUTO DIFF WBC: CPT

## 2021-12-23 PROCEDURE — 81001 URINALYSIS AUTO W/SCOPE: CPT

## 2021-12-23 PROCEDURE — 84146 ASSAY OF PROLACTIN: CPT

## 2021-12-23 PROCEDURE — 92526 ORAL FUNCTION THERAPY: CPT

## 2021-12-23 PROCEDURE — 96361 HYDRATE IV INFUSION ADD-ON: CPT

## 2021-12-23 PROCEDURE — 97530 THERAPEUTIC ACTIVITIES: CPT

## 2021-12-23 PROCEDURE — 93005 ELECTROCARDIOGRAM TRACING: CPT

## 2021-12-23 PROCEDURE — 87798 DETECT AGENT NOS DNA AMP: CPT

## 2021-12-23 PROCEDURE — 87040 BLOOD CULTURE FOR BACTERIA: CPT

## 2021-12-23 PROCEDURE — 84443 ASSAY THYROID STIM HORMONE: CPT

## 2021-12-23 PROCEDURE — 82140 ASSAY OF AMMONIA: CPT

## 2021-12-23 PROCEDURE — 96366 THER/PROPH/DIAG IV INF ADDON: CPT

## 2021-12-23 PROCEDURE — 80180 DRUG SCRN QUAN MYCOPHENOLATE: CPT

## 2021-12-23 PROCEDURE — 94760 N-INVAS EAR/PLS OXIMETRY 1: CPT

## 2021-12-23 PROCEDURE — 99285 EMERGENCY DEPT VISIT HI MDM: CPT

## 2021-12-23 PROCEDURE — 96365 THER/PROPH/DIAG IV INF INIT: CPT

## 2021-12-23 PROCEDURE — 83605 ASSAY OF LACTIC ACID: CPT

## 2021-12-23 PROCEDURE — 92610 EVALUATE SWALLOWING FUNCTION: CPT

## 2021-12-23 PROCEDURE — 84484 ASSAY OF TROPONIN QUANT: CPT

## 2021-12-23 RX ADMIN — DEXTROSE AND SODIUM CHLORIDE SCH MLS/HR: 5; 450 INJECTION, SOLUTION INTRAVENOUS at 19:25

## 2021-12-23 RX ADMIN — HYDROXYCHLOROQUINE SULFATE SCH MG: 200 TABLET, FILM COATED ENTERAL at 21:00

## 2021-12-23 NOTE — HPEPDOC
----- Message from Zee Lackey sent at 11/13/2020  1:31 PM CST -----  Regarding: pt advice  Contact: pt@ [phone]  Pt calling to speak with someone in Dr. Wetzel's office regarding her recent surgery, has questions. Please call.     [facility] Medical History & Physical


Date of Admission


Dec 23, 2021


Date of Service:  Dec 23, 2021


Attending Physician:  Dilip Sheikh





History and Physical


CHIEF COMPLAINT: [65 y/o male sent to our ED from [facility] for 

episode of altered consciousness, shaking]





HISTORY OF PRESENT ILLNESS: [This is a 65 y/o male with a pmh of hemorrhagic cva

 in 11/21, rheumatoid arthritis, htn, gerd, depression/anxiety who presents to 

our ED from [facility] on 12/23 for evaluation of an episode of 

decreased consciousness and shaking that was observed by staff there. Patient is

 a poor historian and thus history is taken from ED staff. It is unclear to me 

at this time how long the episode lasted or if the shaking movements were tonic 

clonic in nature. Patient appears to have no history of epilepsy or seizure 

disorder. Patient is able to tell me that he is in no pain or discomfort. 

Patient is unable to tell me any details of his day.]





PAST MEDICAL HISTORY:


1. [See HPI].





PAST SURGICAL HISTORY:


1. [Colonoscopy].


2. [Cholecystectomy].


3. [Tonsillectomy


4. Appendectomy].





SOCIAL HISTORY:


Unable to obtain d/t mentation





FAMILY HISTORY:


Unable to obtain d/t mentation





ALLERGIES: Please see below.





REVIEW OF SYSTEMS:


Unable to obtain d/t mentation





HOME MEDICATIONS: Please see below. 





PHYSICAL EXAMINATION:


VITAL SIGNS: Please see below.


GENERAL APPEARANCE: [This is a 65 y/o male who is seated in the ED stretcher. He

 answers questions with a few words and is very aphasic. Patient does not make 

eye contact with speech. Patient noted to have tremor of the left hand. He does 

not appear to be in any acute distress]. 


HEENT: [No mass or lesion. EOMI. No scleral icterus. Nares patent. Oral mucosa 

moist].


CARDIOVASCULAR: [Regular rate, rhythm. No murmurs, rubs, gallops].


LUNGS: [Good air flow b/l. No wheezing, rales, rhonchi.].


ABDOMEN: [Soft, nontender].


MUSCULOSKELETAL: [No joint deformity. LUE tremor as mentioned in general].


EXTREMITIES: [No pedal edema appreciated. Pulses intact. No overlying skin 

changes].


NEUROLOGICAL: [Speech unclear. Difficult to assess neuro status in this 

patient].


PSYCHIATRIC: [Unclear how alert or oriented patient is at this time].





LABORATORY DATA: See below.





IMAGING: [CXR: FINDINGS:


There is poor ventilation again noted.  There are crowded lung markings in the 

lung


bases bilaterally.  No infiltrate is seen.  The heart is not significantly 

enlarged.


The mediastinal silhouette is unremarkable and unchanged.





IMPRESSION:


No acute pulmonary disease.





CT Head: FINDINGS:


Low-density/early encephalomalacia in the left basal ganglia at the site of 

prior


hemorrhagic infarction is identified measuring roughly 3.5 cm diameter.  There 

is no


residual or new acute hemorrhage.  The ventricles are relatively symmetric and 

there


is no significant mass effect.  No extra-axial fluid collection.  Calvarium is 

intact.


Paranasal sinuses and mastoid air cells are clear.





IMPRESSION:


Atrophy and microvascular ischemic changes.


Encephalomalacia at the site of recent prior hemorrhagic infarction


No new acute intracranial hemorrhage, infarction, or mass/mass effect.]





MICROBIOLOGY: Please see below. 





ASSESSMENT: [This is a 65 y/o male with a pmh of hemorrhagic cva in 11/21, 

rheumatoid arthritis, htn, gerd, depression/anxiety who presents to our ED from 

[facility] on 12/23 for evaluation of an episode of decreased 

consciousness and shaking that was observed by staff there. It is unclear to me 

at this time how long the episode lasted or if the shaking movements were tonic 

clonic in nature. Patient is unable to tell me any details of his day].


.


PLAN:


1. [Possible Seizure Activity


- I called and personally spoke with Dr. Millan, neurology, who recommended 

patient be admitted to the hospital overnight on observation with frequent neuro

 checks and to begin keppra 750mg bid. Dr. Millan does not feel an EEG is 

necessary at this time and believes if the patient has an uneventful night, it 

would be appropriate to discharge him in the AM and Dr. Millan will see him 

in his office outpatient.


- CT Head performed in the ED showing no acute changes


- Patient received 1g keppra loading dose in the ED


- Begin keppra 750mg bid


- Neurochecks q4 overnight


- Will keep pt on 


- prolactin level ordered


- Admit to pcu under obs





2. Severe aphasia


- 2/2 recent hemorrhagic cva


- ST eval, aspiration precautions





3. RA


- continue plaquenil, sulfasalazine, low dose prednisone





4. HTN


- continue amlodipine





5. GERD


- continue protonix





6. Depression/anxiety


- continue sertraline, mirtazapine





DVT prophylaxis


- mechanical].





Vital Signs





Vital Signs








  Date Time  Temp Pulse Resp B/P (MAP) Pulse Ox O2 Delivery O2 Flow Rate FiO2


 


12/23/21 18:01 98.8 75 20 146/94 (111) 96 Room Air  











Laboratory Data


Labs 24H


Laboratory Tests 2


12/23/21 15:23: 


Immature Granulocyte % (Auto) 3.3H, Neutrophils (%) (Auto) 59.7, Lymphocytes (%)

(Auto) 16.2L, Monocytes (%) (Auto) 13.2H, Eosinophils (%) (Auto) 6.0H, Basophils

(%) (Auto) 1.6H, Neutrophils # (Auto) 5.6, Lymphocytes # (Auto) 1.5, Monocytes #

(Auto) 1.2H, Eosinophils # (Auto) 0.6H, Basophils # (Auto) 0.2, Nucleated Red 

Blood Cells % (auto) 0.0, Anion Gap 8, Glomerular Filtration Rate > 60.0, 

Osmolality 284, Lactic Acid Level 1.5, Calcium Level 8.3L, Total Bilirubin 0.4, 

Direct Bilirubin 0.1, Aspartate Amino Transf (AST/SGOT) 19, Alanine Am

inotransferase (ALT/SGPT) 36, Alkaline Phosphatase 92, Ammonia 26, Total Protein

6.3L, Albumin 3.1L, Albumin/Globulin Ratio 1.0, Thyroid Stimulating Hormone 

(TSH) 0.916, Prolactin 5.8


12/23/21 16:33: POC Troponin I (Misc) 0.00


12/23/21 18:00: 


Urine Color REDH, Urine Appearance HAZY, Urine pH 6.0, Urine Specific Gravity 

1.006, Urine Protein 1+H, Urine Glucose (UA) NEGATIVE, Urine Ketones NEGATIVE, 

Urine Blood 2+H, Urine Nitrite NEGATIVE, Urine Bilirubin NEGATIVE, Urine 

Urobilinogen 0.2, Urine Leukocyte Esterase NEGATIVE, Urine WBC (Auto) 0, Urine 

RBC (Auto) TNTCH, Urine Hyaline Casts (Auto) 0, Urine Bacteria (Auto) NEGATIVE, 

Urine Squamous Epithelial Cells 0, Urine Sperm (Auto)


CBC/BMP


Laboratory Tests


12/23/21 15:23








Microbiology





Microbiology


12/23/21 Respiratory Virus Panel (PCR) (RHEA), Received


           Pending


12/23/21 Blood Culture, Received


           Pending


12/23/21 Blood Culture, Received


           Pending





Home Medications


Scheduled


Amlodipine Besylate (Amlodipine Besylate) 10 Mg Tablet, 10 MG PO QHS


Aspirin (Aspirin) 81 Mg Tab.chew, 81 MG PO DAILY


Dextrose 5 % and 0.9 % NaCl (Dextrose 5%-0.9% NaCl IV Soln) 500 Ml Iv.soln, 80 

ML IV DAILY


   [id number] 


Hydroxychloroquine Sulfate (Hydroxychloroquine Sulfate) 200 Mg Tab, 200 MG PO 

BID


Leflunomide (Leflunomide) 20 Mg Tab, 20 MG PO DAILY


Lisinopril (Lisinopril) 20 Mg Tablet, 20 MG PO DAILY


Mirtazapine (Remeron) 15 Mg Tab.rapdis, 15 MG PO DAILY


Pantoprazole Sodium (Protonix) 40 Mg Granpkt.dr, 40 MG PO DAILY


Prednisone (Prednisone) 5 Mg Tab, 5 MG PO DAILY


Psyllium Husk (with Sugar) (Metamucil Powder) 575 Gm Powder, 1 PKT PO BID


Sennosides (Senna) 8.6 Mg Tablet, 17.2 MG PO DAILY


Sertraline Hcl (Sertraline HCl) 25 Mg Tablet, 25 MG PO DAILY


Sulfasalazine (Sulfasalazine) 500 Mg Tablet, 1,000 MG PO BID





Allergies


Coded Allergies:  


     Penicillins (Verified  Allergy, Unknown, 11/8/21)





A-FIB/CHADSVASC


A-FIB History


Current/History of A-Fib/PAF?:  No


Current PO Anticoag Therapy:  No











RANGEL STOVER            Dec 23, 2021 19:21

## 2021-12-23 NOTE — REP
INDICATION:

Altered Mental Status



COMPARISON:

11/08/2021



TECHNIQUE:

Axial noncontrast images from the skull base to the thoracic inlet with coronal

reformations.



This CT examination was performed using the following dose reduction techniques:

Automated exposure control, adjustment of mA and/or kv according to the patient's

size, and use of iterative reconstruction technique.



FINDINGS:

Low-density/early encephalomalacia in the left basal ganglia at the site of prior

hemorrhagic infarction is identified measuring roughly 3.5 cm diameter.  There is no

residual or new acute hemorrhage.  The ventricles are relatively symmetric and there

is no significant mass effect.  No extra-axial fluid collection.  Calvarium is intact.

Paranasal sinuses and mastoid air cells are clear.



IMPRESSION:

Atrophy and microvascular ischemic changes.

Encephalomalacia at the site of recent prior hemorrhagic infarction

No new acute intracranial hemorrhage, infarction, or mass/mass effect.





<Electronically signed by Nolan Piedra > 12/23/21 [id number]

## 2021-12-23 NOTE — REP
INDICATION:

Altered Mental Status.



COMPARISON:

11/08/2021.



TECHNIQUE:

Single portable AP view of the chest was performed.



FINDINGS:

There is poor ventilation again noted.  There are crowded lung markings in the lung

bases bilaterally.  No infiltrate is seen.  The heart is not significantly enlarged.

The mediastinal silhouette is unremarkable and unchanged.



IMPRESSION:

No acute pulmonary disease.





<Electronically signed by Lenin Hutchison > 12/23/21 [id number]

## 2021-12-24 VITALS — SYSTOLIC BLOOD PRESSURE: 125 MMHG | DIASTOLIC BLOOD PRESSURE: 67 MMHG

## 2021-12-24 VITALS — SYSTOLIC BLOOD PRESSURE: 108 MMHG | DIASTOLIC BLOOD PRESSURE: 65 MMHG

## 2021-12-24 VITALS — DIASTOLIC BLOOD PRESSURE: 78 MMHG | SYSTOLIC BLOOD PRESSURE: 131 MMHG

## 2021-12-24 VITALS — SYSTOLIC BLOOD PRESSURE: 124 MMHG | DIASTOLIC BLOOD PRESSURE: 71 MMHG

## 2021-12-24 VITALS — DIASTOLIC BLOOD PRESSURE: 68 MMHG | SYSTOLIC BLOOD PRESSURE: 121 MMHG

## 2021-12-24 VITALS — SYSTOLIC BLOOD PRESSURE: 121 MMHG | DIASTOLIC BLOOD PRESSURE: 75 MMHG

## 2021-12-24 VITALS — SYSTOLIC BLOOD PRESSURE: 102 MMHG | DIASTOLIC BLOOD PRESSURE: 64 MMHG

## 2021-12-24 VITALS — SYSTOLIC BLOOD PRESSURE: 142 MMHG | DIASTOLIC BLOOD PRESSURE: 81 MMHG

## 2021-12-24 LAB
BASOPHILS # BLD AUTO: 0.2 10^3/UL (ref 0–0.2)
BASOPHILS NFR BLD AUTO: 1.7 % (ref 0–1)
BUN SERPL-MCNC: 11 MG/DL (ref 7–18)
CALCIUM SERPL-MCNC: 8.2 MG/DL (ref 8.8–10.2)
CHLORIDE SERPL-SCNC: 110 MEQ/L (ref 98–107)
CO2 SERPL-SCNC: 25 MEQ/L (ref 21–32)
CREAT SERPL-MCNC: 0.8 MG/DL (ref 0.7–1.3)
EOSINOPHIL # BLD AUTO: 0.6 10^3/UL (ref 0–0.5)
EOSINOPHIL NFR BLD AUTO: 6.8 % (ref 0–3)
GFR SERPL CREATININE-BSD FRML MDRD: > 60 ML/MIN/{1.73_M2} (ref 49–?)
GLUCOSE SERPL-MCNC: 93 MG/DL (ref 70–100)
HCT VFR BLD AUTO: 35 % (ref 42–52)
HGB BLD-MCNC: 11 G/DL (ref 13.5–17.5)
LYMPHOCYTES # BLD AUTO: 1.6 10^3/UL (ref 1.5–5)
LYMPHOCYTES NFR BLD AUTO: 18.6 % (ref 24–44)
MCH RBC QN AUTO: 31.2 PG (ref 27–33)
MCHC RBC AUTO-ENTMCNC: 31.4 G/DL (ref 32–36.5)
MCV RBC AUTO: 99.2 FL (ref 80–96)
MONOCYTES # BLD AUTO: 1.2 10^3/UL (ref 0–0.8)
MONOCYTES NFR BLD AUTO: 13.4 % (ref 2–8)
NEUTROPHILS # BLD AUTO: 4.8 10^3/UL (ref 1.5–8.5)
NEUTROPHILS NFR BLD AUTO: 55.9 % (ref 36–66)
PLATELET # BLD AUTO: 204 10^3/UL (ref 150–450)
POTASSIUM SERPL-SCNC: 3.7 MEQ/L (ref 3.5–5.1)
RBC # BLD AUTO: 3.53 10^6/UL (ref 4.3–6.1)
SODIUM SERPL-SCNC: 140 MEQ/L (ref 136–145)
WBC # BLD AUTO: 8.6 10^3/UL (ref 4–10)

## 2021-12-24 RX ADMIN — LEVETIRACETAM SCH MLS/HR: 500 INJECTION, SOLUTION, CONCENTRATE INTRAVENOUS at 18:51

## 2021-12-24 RX ADMIN — HYDROXYCHLOROQUINE SULFATE SCH MG: 200 TABLET, FILM COATED ENTERAL at 12:18

## 2021-12-24 RX ADMIN — HYDROXYCHLOROQUINE SULFATE SCH MG: 200 TABLET, FILM COATED ENTERAL at 20:38

## 2021-12-24 RX ADMIN — MIRTAZAPINE SCH MG: 15 TABLET, FILM COATED ORAL at 12:16

## 2021-12-24 RX ADMIN — SENNOSIDES SCH TAB: 8.6 TABLET, FILM COATED ORAL at 12:15

## 2021-12-24 RX ADMIN — DEXTROSE AND SODIUM CHLORIDE SCH MLS/HR: 5; 450 INJECTION, SOLUTION INTRAVENOUS at 05:48

## 2021-12-24 RX ADMIN — LEVETIRACETAM SCH MLS/HR: 500 INJECTION, SOLUTION, CONCENTRATE INTRAVENOUS at 06:32

## 2021-12-24 RX ADMIN — SERTRALINE HYDROCHLORIDE SCH MG: 25 TABLET ORAL at 12:16

## 2021-12-24 RX ADMIN — ASPIRIN 81 MG CHEWABLE TABLET SCH MG: 81 TABLET CHEWABLE at 12:15

## 2021-12-24 RX ADMIN — PANTOPRAZOLE SODIUM SCH MG: 40 TABLET, DELAYED RELEASE ORAL at 12:16

## 2021-12-24 NOTE — REP
INDICATION:

AMS



COMPARISON:

12/23/2021, 11/08/2021



TECHNIQUE:

Axial noncontrast images from the skull base to the thoracic inlet with coronal

reformations.



This CT examination was performed using the following dose reduction techniques:

Automated exposure control, adjustment of mA and/or kv according to the patient's

size, and use of iterative reconstruction technique.



FINDINGS:

There is no change from the most recent prior examination dated 12/23/2021 which again

demonstrates an area of forming and cephalo malacia in the left frontal lobe/basal

ganglia at the site of previous large hemorrhagic infarction.  Underlying atrophic

changes are stable.  The ventricles are symmetric and there is no evidence for acute

edema or mass effect.  No new area of hemorrhage or infarction is identified.  No

extra-axial fluid collection.



IMPRESSION:

No change from most recent prior examination dated 12/23/2021.





<Electronically signed by Nolan Piedra > 12/24/21 [id number]

## 2021-12-24 NOTE — REP
INDICATION:

hematutia



COMPARISON:

None



TECHNIQUE:

Real time gray scale ultrasound examination using curved array transducer.



FINDINGS:

Bilateral kidneys are normal in contour, size, echogenicity, and reniform shape.  No

hydronephrosis, nephrolithiasis, cystic or renal mass lesion.  No perinephric fluid

collection.



Right kidney measures 12.3 x 6.7 x 5.1 cm.

Left kidney measures 11.7 x 6.2 x 5.4 cm.



Hyman catheter identified in relatively normal appearing bladder.  Bilateral ureteral

jets were identified.





IMPRESSION:

1. Normal renal ultrasound.





<Electronically signed by Nolan Piedra > 12/24/21 [id number]

## 2021-12-24 NOTE — REP
INDICATION:

hematutia



COMPARISON:

None



TECHNIQUE:

Real time B-mode ultrasound examination using curved array transducer.



FINDINGS:

Bladder is normal in appearance without wall thickening or mass lesion.  Hyman

catheter in satisfactory position.  Bilateral ureteral jets are identified.



Prevoid bladder measures 9.7 x 7.8 x 8.9 cm



IMPRESSION:

1. Normal bladder ultrasound.

2. Yhman catheter in satisfactory position.





<Electronically signed by Nolan Piedra > 12/24/21 1100

## 2021-12-24 NOTE — ECGEPIP
[facility] - ED

                                       

                                       Test Date:    2021

Pat Name:     SANDRA ALTAMIRANO           Department:   

Patient ID:   [id number]                 Room:         -

Gender:       Male                     Technician:   SUKUMAR

:          1955               Requested By: MERLIN PARSONS

Order Number: [id number]     Reading MD:   Yefri Melendez

                                 Measurements

Intervals                              Axis          

Rate:         73                       P:            36

CA:           154                      QRS:          7

QRSD:         78                       T:            61

QT:           380                                    

QTc:          418                                    

                           Interpretive Statements

Normal sinus rhythm

POOR R WAVE PROGRESSION

Electronically Signed on 2021 8:29:07 EST by Yefri Melendez

## 2021-12-24 NOTE — DS.PDOC
Discharge Summary


General


Date of Admission


Dec 23, 2021 at 18:36


Date of Discharge


12/27/21





Discharge Summary


PROCEDURES PERFORMED DURING STAY: [None].





ADMITTING DIAGNOSES: 


Suspected seizure activity


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


aphasia


rheumatoid arthritis


HTN


GERD


Depression Anxiety





DISCHARGE DIAGNOSES:


Suspected seizure activity


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


aphasia


rheumatoid arthritis


HTN


GERD


Depression Anxiety





COMPLICATIONS/CHIEF COMPLAINT: Seizure.





HISTORY OF PRESENT ILLNESS: This is a 67 y/o male with a pmh of hemorrhagic cva 

in 11/21, rheumatoid arthritis, htn, gerd, depression/anxiety who presents to 

our ED from [facility] on 12/23 for evaluation of an episode of 

decreased consciousness and shaking that was observed by staff there. Patient is

 a poor historian and thus history is taken from ED staff. It is unclear to me 

at this time how long the episode lasted or if the shaking movements were tonic 

clonic in nature. Patient appears to have no history of epilepsy or seizure 

disorder. Patient is able to tell me that he is in no pain or discomfort. 

Patient is unable to tell me any details of his day.





HOSPITAL COURSE: 





1. Possible Seizure Activity


- I spoke to Dr. Millan. S/p 1000 mg IV keppra in ER. No recommendation for 

EEG. To c/w keppra 750 mg BID. N


- referral for outpatient neurology


- CT Head performed in the ED showing no acute changes


- c/w keppra 750mg bid


- Neurochecks q4 have been unremarkable.


- ST eval completed


- repeat CT head 12/24/21 is unchanged


- patient did have an isolated episode of rapid eye lid movement/eye movement, 

which stopped upon prompting patient.


- I d/w with Monty, it is possible that patient is having mild seizures vs 

this being a behavioural process vs subclinical seizures. He will likely require

 a video EEG on an outpatient basis. At this time, neurology feels he is safe 

for DC to [facility] and will be following up with him in clinic.





2. Severe aphasia


- 2/2 recent hemorrhagic cva


- ST eval, aspiration precautions





3. RA


- continue plaquenil, sulfasalazine, low dose prednisone





4. HTN


- reduce dose of amlodipine to 5 mg daily


- patient sister reports that he had an episode of hypotension upon 

repositioning


- perhaps this was due to orthostasis from sitting to supine


- we will check orthostats


- applied compression stocking





5. GERD


- continue protonix





6. Depression/anxiety


- continue sertraline, mirtazapine





7. Paraphimosis


- RN called to inform me that the gland penis is very swollen


- noted to have foreskin greatly swollen and retracted, I suspect it was 

replaced after josé catheter insertion


- Dr. Chester from urology came to bedside and was successful in restoring 

foreskin over glans penis


- we will DC josé cath. Trial of void, PVR ordered. 





DISCHARGE MEDICATIONS: Please see below.


 


ALLERGIES: Please see below.





PHYSICAL EXAMINATION ON DISCHARGE:


VITAL SIGNS: please see below


General: NAD, comfortable


HEENT: PERRLA, EOMI, sclerae clear


Neck: supple, normal ROM, no JVD


Respiratory: lungs CTAB, no wheeze, no rales, no crackles


CVS: RRR, normal S1, S2, no murmurs


Abdo: soft, no masses, no hepatosplenomegaly, BS+, no rebound tenderness


Extremities: no edema, pulses 2+


MSK: no joint deformities, normal ROM


Neuro: no focal neuro deficits, moving all 4 extremities, CN2-12 intact.  

Strength 4+ out of 5 on the left upper and lower extremity.  Hemiplegia on the 

right side.  Aphasia.  Right-sided facial droop.


Psych: calm, cooperative, AAO x 2-3





LABORATORY DATA: Please see below.





IMAGING: 





CT head wo contrast (12/24/21):


No change from most recent prior examination dated 12/23/2021.





CXR: FINDINGS:


There is poor ventilation again noted.  There are crowded lung markings in the 

lung


bases bilaterally.  No infiltrate is seen.  The heart is not significantly 

enlarged.


The mediastinal silhouette is unremarkable and unchanged.





IMPRESSION:


No acute pulmonary disease.





CT Head: FINDINGS:


Low-density/early encephalomalacia in the left basal ganglia at the site of 

prior


hemorrhagic infarction is identified measuring roughly 3.5 cm diameter.  There 

is no


residual or new acute hemorrhage.  The ventricles are relatively symmetric and 

there


is no significant mass effect.  No extra-axial fluid collection.  Calvarium is 

intact.


Paranasal sinuses and mastoid air cells are clear.





IMPRESSION:


Atrophy and microvascular ischemic changes.


Encephalomalacia at the site of recent prior hemorrhagic infarction


No new acute intracranial hemorrhage, infarction, or mass/mass effect.]





PROGNOSIS: good





ACTIVITY: as per rehab, total assist. ongoing PT at [facility].





DIET:  





-Level 1 diet


   -Nectar thick liquids


   -Meds crushed in puree assist


   -Staff assist for all PO intake


   -ST services 3-5x wk to facilitate tolerance of the 


   safest, least restrictive diet





DISCHARGE PLAN: DC to [facility]. Continue to take keppra 750 mg PO 

BID. F/u with [facility] 3-5 days. Follow up with neurology Dr. Millan in 1-2 

weeks. He may require a video EEG on an outpatient basis. Follow up with 

rheumatology in 1-2 weeks. 





DISPOSITION: DC to [facility]





DISCHARGE INSTRUCTIONS:


. Please follow-up with your primary care doctor within 3-5 days


. Please follow-up with neurology within 1-2 weeks


. Please f/u rheumatology in 1-2 weeks. 


. Please take medications as prescribed.


. If you develop bleeding, chest pain, shortness of breath, seizures, nausea, 

fevers, or otherwise worsening of your symptoms, please call 911 or return to 

the nearest emergency room





ITEMS TO FOLLOWUP ON ON OUTPATIENT:


Final blood cultures


ensure neurology follow up. 


DISCHARGE CONDITION: [Stable].





TIME SPENT ON DISCHARGE: 35 minutes





Vital Signs/I&Os





Vital Signs








  Date Time  Temp Pulse Resp B/P (MAP) Pulse Ox O2 Delivery O2 Flow Rate FiO2


 


12/24/21 08:59 98.0 59 18 124/71 (88) 96 Room Air  














I&O- Last 24 Hours up to 6 AM0 


 


 12/24/21





 06:00


 


Intake Total 1010 ml


 


Output Total 825 ml


 


Balance 185 ml











Laboratory Data


Labs 24H


Laboratory Tests 2


12/23/21 15:23: 


Immature Granulocyte % (Auto) 3.3H, Neutrophils (%) (Auto) 59.7, Lymphocytes (%)

(Auto) 16.2L, Monocytes (%) (Auto) 13.2H, Eosinophils (%) (Auto) 6.0H, Basophils

(%) (Auto) 1.6H, Neutrophils # (Auto) 5.6, Lymphocytes # (Auto) 1.5, Monocytes #

(Auto) 1.2H, Eosinophils # (Auto) 0.6H, Basophils # (Auto) 0.2, Nucleated Red 

Blood Cells % (auto) 0.0, Anion Gap 8, Glomerular Filtration Rate > 60.0, 

Osmolality 284, Lactic Acid Level 1.5, Calcium Level 8.3L, Total Bilirubin 0.4, 

Direct Bilirubin 0.1, Aspartate Amino Transf (AST/SGOT) 19, Alanine 

Aminotransferase (ALT/SGPT) 36, Alkaline Phosphatase 92, Ammonia 26, Total 

Protein 6.3L, Albumin 3.1L, Albumin/Globulin Ratio 1.0, Thyroid Stimulating 

Hormone (TSH) 0.916, Prolactin 5.8


12/23/21 16:33: POC Troponin I (Misc) 0.00


12/23/21 18:00: 


Urine Color REDH, Urine Appearance HAZY, Urine pH 6.0, Urine Specific Gravity 

1.006, Urine Protein 1+H, Urine Glucose (UA) NEGATIVE, Urine Ketones NEGATIVE, 

Urine Blood 2+H, Urine Nitrite NEGATIVE, Urine Bilirubin NEGATIVE, Urine 

Urobilinogen 0.2, Urine Leukocyte Esterase NEGATIVE, Urine WBC (Auto) 0, Urine 

RBC (Auto) TNTCH, Urine Hyaline Casts (Auto) 0, Urine Bacteria (Auto) NEGATIVE, 

Urine Squamous Epithelial Cells 0, Urine Sperm (Auto) 


12/24/21 05:32: Total Creatine Kinase 19L


12/24/21 05:36: 


Immature Granulocyte % (Auto) 3.6H, Neutrophils (%) (Auto) 55.9, Lymphocytes (%)

(Auto) 18.6L, Monocytes (%) (Auto) 13.4H, Eosinophils (%) (Auto) 6.8H, Basophils

(%) (Auto) 1.7H, Neutrophils # (Auto) 4.8, Lymphocytes # (Auto) 1.6, Monocytes #

(Auto) 1.2H, Eosinophils # (Auto) 0.6H, Basophils # (Auto) 0.2, Nucleated Red 

Blood Cells % (auto) 0.0, Anion Gap 5L, Glomerular Filtration Rate > 60.0, 

Calcium Level 8.2L


CBC/BMP


Laboratory Tests


12/23/21 15:23








12/24/21 05:36











Microbiology





Microbiology


12/23/21 Respiratory Virus Panel (PCR) (RHEA) - Final, Complete


           


12/23/21 Blood Culture, Received


           Pending


12/23/21 Blood Culture, Received


           Pending





Discharge Medications


Scheduled


Amlodipine Besylate (Amlodipine Besylate) 10 Mg Tablet, 10 MG PO QHS, (Reported)


Amlodipine Besylate (Amlodipine Besylate) 5 Mg Tablet, 1 TAB PO DAILY


Aspirin (Aspirin) 81 Mg Tab.chew, 81 MG PO DAILY, (Reported)


Dextrose 5 % and 0.9 % NaCl (Dextrose 5%-0.9% NaCl IV Soln) 500 Ml Iv.soln, 80 

ML IV DAILY, (Reported)


   [id number] 


Hydroxychloroquine Sulfate (Hydroxychloroquine Sulfate) 200 Mg Tab, 200 MG PO 

BID, (Reported)


Leflunomide (Leflunomide) 20 Mg Tab, 20 MG PO DAILY, (Reported)


Levetiracetam (Keppra) 750 Mg Tablet, 750 MG PO BID


Lisinopril (Lisinopril) 20 Mg Tablet, 20 MG PO DAILY, (Reported)


Mirtazapine (Remeron) 15 Mg Tab.rapdis, 15 MG PO DAILY, (Reported)


Pantoprazole Sodium (Protonix) 40 Mg Granpkt.dr, 40 MG PO DAILY, (Reported)


Prednisone (Prednisone) 5 Mg Tab, 5 MG PO DAILY, (Reported)


Psyllium Husk (with Sugar) (Metamucil Powder) 575 Gm Powder, 1 PKT PO BID, 

(Reported)


Sennosides (Senna) 8.6 Mg Tablet, 17.2 MG PO DAILY, (Reported)


Sertraline Hcl (Sertraline HCl) 25 Mg Tablet, 25 MG PO DAILY, (Reported)


Sulfasalazine (Sulfasalazine) 500 Mg Tablet, 1,000 MG PO BID, (Reported)





Allergies


Coded Allergies:  


     Penicillins (Verified  Allergy, Unknown, 11/8/21)











ABIGAIL CAMPOS MD       Dec 24, 2021 12:01

## 2021-12-24 NOTE — IPNPDOC
Date Seen


The patient was seen on 12/24/21.





Progress Note


SUBJECTIVE: Patient seen examined at bedside.  He appears to be alert to person 

place.  He is able to follow my commands.  He is able to tell me about his 

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

 Patient continues to have a tremor in his left hand which I have confirmed with

the patient and his sister that he had prior to the hemorrhagic stroke and has 

seen Dr. Millan for in the past.  He comes to us from [facility] rehab 

for suspected seizure activity.  Initial CT head in the ER was negative.  He did

not have additional seizures overnight.





OBJECTIVE


PHYSICAL EXAMINATION:


VITAL SIGNS: please see below


General: NAD, comfortable


HEENT: PERRLA, EOMI, sclerae clear


Neck: supple, normal ROM, no JVD


Respiratory: lungs CTAB, no wheeze, no rales, no crackles


CVS: RRR, normal S1, S2, no murmurs


Abdo: soft, no masses, no hepatosplenomegaly, BS+, no rebound tenderness


Extremities: no edema, pulses 2+


MSK: no joint deformities, normal ROM


Neuro: no focal neuro deficits, moving all 4 extremities, CN2-12 intact.  

Strength 4+ out of 5 on the left upper and lower extremity.  Hemiplegia on the 

right side.  Aphasia.  Right-sided facial droop.


Psych: calm, cooperative, AAO x 3





LABORATORY DATA, IMAGING STUDIES, MICROBIOLOGY: Please see below.





CT head wo contrast (12/24/21):


No change from most recent prior examination dated 12/23/2021.





DVT prophylaxis ordered?:  SCDs and teds





PROBLEMS:


1. Possible Seizure Activity


- I spoke to Dr. Millan. S/p 1000 mg IV keppra in ER. No recommendation for 

EEG. To c/w keppra 750 mg BID. N


- referral for outpatient neurology


- CT Head performed in the ED showing no acute changes


- c/w keppra 750mg bid


- Neurochecks q4 overnight


- ST eval completed


- repeat CT head this morning without any changes. 





2. Severe aphasia


- 2/2 recent hemorrhagic cva


- ST eval, aspiration precautions





3. RA


- continue plaquenil, sulfasalazine, low dose prednisone





4. HTN


- reduce dose of amlodipine to 5 mg daily


- patient sister reports that he had an episode of hypotension upon 

repositioning


- perhaps this was due to orthostatis from sitting to supine


- we will check orthostats


- applyied compression stocking





5. GERD


- continue protonix





6. Depression/anxiety


- continue sertraline, mirtazapine





7. Paraphimosis


- RN called to inform me that the gland penis is very swollen. Hyman in place. 


- noted to have foreskin greatly swollen and retracted


- Dr. Chester from urology came to bedside and was successful in restoring 

foreskin over glans penis





DVT prophylaxis


- mechanical





Dispo: patient is medically cleared. Due to Christmas holiday, transportation to

[facility] rehab is unable to be arranged due to staffing issues. We will 

await on call PFS on 12/26/21 for assistance. Patient transfered to [facility] status.





VS, I&O, 24H, [facility]


Vital Signs/I&O





Vital Signs








  Date Time  Temp Pulse Resp B/P (MAP) Pulse Ox O2 Delivery O2 Flow Rate FiO2


 


12/24/21 12:16    141/82    


 


12/24/21 08:59 98.0 59 18  96 Room Air  














I&O- Last 24 Hours up to 6 AM 


 


 12/24/21





 06:00


 


Intake Total 1010 ml


 


Output Total 825 ml


 


Balance 185 ml











Laboratory Data


24H LABS


Laboratory Tests 2


12/23/21 15:23: 


Immature Granulocyte % (Auto) 3.3H, Neutrophils (%) (Auto) 59.7, Lymphocytes (%)

(Auto) 16.2L, Monocytes (%) (Auto) 13.2H, Eosinophils (%) (Auto) 6.0H, Basophils

(%) (Auto) 1.6H, Neutrophils # (Auto) 5.6, Lymphocytes # (Auto) 1.5, Monocytes #

(Auto) 1.2H, Eosinophils # (Auto) 0.6H, Basophils # (Auto) 0.2, Nucleated Red 

Blood Cells % (auto) 0.0, Anion Gap 8, Glomerular Filtration Rate > 60.0, 

Osmolality 284, Lactic Acid Level 1.5, Calcium Level 8.3L, Total Bilirubin 0.4, 

Direct Bilirubin 0.1, Aspartate Amino Transf (AST/SGOT) 19, Alanine 

Aminotransferase (ALT/SGPT) 36, Alkaline Phosphatase 92, Ammonia 26, Total 

Protein 6.3L, Albumin 3.1L, Albumin/Globulin Ratio 1.0, Thyroid Stimulating 

Hormone (TSH) 0.916, Prolactin 5.8


12/23/21 16:33: POC Troponin I (Misc) 0.00


12/23/21 18:00: 


Urine Color REDH, Urine Appearance HAZY, Urine pH 6.0, Urine Specific Gravity 

1.006, Urine Protein 1+H, Urine Glucose (UA) NEGATIVE, Urine Ketones NEGATIVE, 

Urine Blood 2+H, Urine Nitrite NEGATIVE, Urine Bilirubin NEGATIVE, Urine 

Urobilinogen 0.2, Urine Leukocyte Esterase NEGATIVE, Urine WBC (Auto) 0, Urine 

RBC (Auto) TNTCH, Urine Hyaline Casts (Auto) 0, Urine Bacteria (Auto) NEGATIVE, 

Urine Squamous Epithelial Cells 0, Urine Sperm (Auto) 


12/24/21 05:32: Total Creatine Kinase 19L


12/24/21 05:36: 


Immature Granulocyte % (Auto) 3.6H, Neutrophils (%) (Auto) 55.9, Lymphocytes (%)

(Auto) 18.6L, Monocytes (%) (Auto) 13.4H, Eosinophils (%) (Auto) 6.8H, Basophils

(%) (Auto) 1.7H, Neutrophils # (Auto) 4.8, Lymphocytes # (Auto) 1.6, Monocytes #

(Auto) 1.2H, Eosinophils # (Auto) 0.6H, Basophils # (Auto) 0.2, Nucleated Red 

Blood Cells % (auto) 0.0, Anion Gap 5L, Glomerular Filtration Rate > 60.0, 

Calcium Level 8.2L


CBC/BMP


Laboratory Tests


12/23/21 15:23








12/24/21 05:36








Microbiology





Microbiology


12/23/21 Respiratory Virus Panel (PCR) (RHEA) - Final, Complete


           


12/23/21 Blood Culture, Received


           Pending


12/23/21 Blood Culture, Received


           Pending











ABIGAIL CAMPOS MD       Dec 24, 2021 12:49

## 2021-12-25 VITALS — DIASTOLIC BLOOD PRESSURE: 66 MMHG | SYSTOLIC BLOOD PRESSURE: 106 MMHG

## 2021-12-25 LAB
BASOPHILS # BLD AUTO: 0.1 10^3/UL (ref 0–0.2)
BASOPHILS NFR BLD AUTO: 1.4 % (ref 0–1)
BUN SERPL-MCNC: 8 MG/DL (ref 7–18)
CALCIUM SERPL-MCNC: 8.4 MG/DL (ref 8.8–10.2)
CHLORIDE SERPL-SCNC: 108 MEQ/L (ref 98–107)
CO2 SERPL-SCNC: 28 MEQ/L (ref 21–32)
CREAT SERPL-MCNC: 0.88 MG/DL (ref 0.7–1.3)
EOSINOPHIL # BLD AUTO: 0.5 10^3/UL (ref 0–0.5)
EOSINOPHIL NFR BLD AUTO: 6 % (ref 0–3)
GFR SERPL CREATININE-BSD FRML MDRD: > 60 ML/MIN/{1.73_M2} (ref 49–?)
GLUCOSE SERPL-MCNC: 106 MG/DL (ref 70–100)
HCT VFR BLD AUTO: 35.1 % (ref 42–52)
HGB BLD-MCNC: 11.2 G/DL (ref 13.5–17.5)
LYMPHOCYTES # BLD AUTO: 1.7 10^3/UL (ref 1.5–5)
LYMPHOCYTES NFR BLD AUTO: 21.6 % (ref 24–44)
MCH RBC QN AUTO: 31.5 PG (ref 27–33)
MCHC RBC AUTO-ENTMCNC: 31.9 G/DL (ref 32–36.5)
MCV RBC AUTO: 98.9 FL (ref 80–96)
MONOCYTES # BLD AUTO: 1 10^3/UL (ref 0–0.8)
MONOCYTES NFR BLD AUTO: 12.6 % (ref 2–8)
NEUTROPHILS # BLD AUTO: 4.3 10^3/UL (ref 1.5–8.5)
NEUTROPHILS NFR BLD AUTO: 56 % (ref 36–66)
PLATELET # BLD AUTO: 210 10^3/UL (ref 150–450)
POTASSIUM SERPL-SCNC: 4 MEQ/L (ref 3.5–5.1)
RBC # BLD AUTO: 3.55 10^6/UL (ref 4.3–6.1)
SODIUM SERPL-SCNC: 141 MEQ/L (ref 136–145)
WBC # BLD AUTO: 7.8 10^3/UL (ref 4–10)

## 2021-12-25 RX ADMIN — LEVETIRACETAM SCH MLS/HR: 500 INJECTION, SOLUTION, CONCENTRATE INTRAVENOUS at 05:17

## 2021-12-25 RX ADMIN — SENNOSIDES SCH TAB: 8.6 TABLET, FILM COATED ORAL at 09:28

## 2021-12-25 RX ADMIN — ASPIRIN 81 MG CHEWABLE TABLET SCH MG: 81 TABLET CHEWABLE at 09:29

## 2021-12-25 RX ADMIN — HYDROXYCHLOROQUINE SULFATE SCH MG: 200 TABLET, FILM COATED ENTERAL at 09:29

## 2021-12-25 RX ADMIN — SERTRALINE HYDROCHLORIDE SCH MG: 25 TABLET ORAL at 09:29

## 2021-12-25 RX ADMIN — MIRTAZAPINE SCH MG: 15 TABLET, FILM COATED ORAL at 09:29

## 2021-12-25 RX ADMIN — PANTOPRAZOLE SODIUM SCH MG: 40 TABLET, DELAYED RELEASE ORAL at 09:28

## 2021-12-25 RX ADMIN — LEVETIRACETAM SCH MG: 250 TABLET, FILM COATED ORAL at 21:05

## 2021-12-25 RX ADMIN — HYDROXYCHLOROQUINE SULFATE SCH MG: 200 TABLET, FILM COATED ENTERAL at 21:05

## 2021-12-26 VITALS — DIASTOLIC BLOOD PRESSURE: 76 MMHG | SYSTOLIC BLOOD PRESSURE: 120 MMHG

## 2021-12-26 LAB
BASOPHILS # BLD AUTO: 0.1 10^3/UL (ref 0–0.2)
BASOPHILS NFR BLD AUTO: 1.1 % (ref 0–1)
BUN SERPL-MCNC: 11 MG/DL (ref 7–18)
CALCIUM SERPL-MCNC: 8.7 MG/DL (ref 8.8–10.2)
CHLORIDE SERPL-SCNC: 108 MEQ/L (ref 98–107)
CO2 SERPL-SCNC: 28 MEQ/L (ref 21–32)
CREAT SERPL-MCNC: 1.03 MG/DL (ref 0.7–1.3)
EOSINOPHIL # BLD AUTO: 0.4 10^3/UL (ref 0–0.5)
EOSINOPHIL NFR BLD AUTO: 3.8 % (ref 0–3)
GFR SERPL CREATININE-BSD FRML MDRD: > 60 ML/MIN/{1.73_M2} (ref 49–?)
GLUCOSE SERPL-MCNC: 94 MG/DL (ref 70–100)
HCT VFR BLD AUTO: 37.1 % (ref 42–52)
HGB BLD-MCNC: 11.8 G/DL (ref 13.5–17.5)
LYMPHOCYTES # BLD AUTO: 1.5 10^3/UL (ref 1.5–5)
LYMPHOCYTES NFR BLD AUTO: 15.3 % (ref 24–44)
MCH RBC QN AUTO: 31.1 PG (ref 27–33)
MCHC RBC AUTO-ENTMCNC: 31.8 G/DL (ref 32–36.5)
MCV RBC AUTO: 97.9 FL (ref 80–96)
MONOCYTES # BLD AUTO: 1.4 10^3/UL (ref 0–0.8)
MONOCYTES NFR BLD AUTO: 13.7 % (ref 2–8)
NEUTROPHILS # BLD AUTO: 6.4 10^3/UL (ref 1.5–8.5)
NEUTROPHILS NFR BLD AUTO: 64.2 % (ref 36–66)
PLATELET # BLD AUTO: 215 10^3/UL (ref 150–450)
POTASSIUM SERPL-SCNC: 4.4 MEQ/L (ref 3.5–5.1)
RBC # BLD AUTO: 3.79 10^6/UL (ref 4.3–6.1)
SODIUM SERPL-SCNC: 142 MEQ/L (ref 136–145)
WBC # BLD AUTO: 9.9 10^3/UL (ref 4–10)

## 2021-12-26 RX ADMIN — HYDROXYCHLOROQUINE SULFATE SCH MG: 200 TABLET, FILM COATED ENTERAL at 20:41

## 2021-12-26 RX ADMIN — SENNOSIDES SCH TAB: 8.6 TABLET, FILM COATED ORAL at 08:48

## 2021-12-26 RX ADMIN — LEVETIRACETAM SCH MG: 250 TABLET, FILM COATED ORAL at 20:41

## 2021-12-26 RX ADMIN — HYDROXYCHLOROQUINE SULFATE SCH MG: 200 TABLET, FILM COATED ENTERAL at 08:47

## 2021-12-26 RX ADMIN — LEVETIRACETAM SCH MG: 250 TABLET, FILM COATED ORAL at 08:48

## 2021-12-26 RX ADMIN — SERTRALINE HYDROCHLORIDE SCH MG: 25 TABLET ORAL at 08:47

## 2021-12-26 RX ADMIN — PANTOPRAZOLE SODIUM SCH MG: 40 TABLET, DELAYED RELEASE ORAL at 08:47

## 2021-12-26 RX ADMIN — ASPIRIN 81 MG CHEWABLE TABLET SCH MG: 81 TABLET CHEWABLE at 08:47

## 2021-12-26 RX ADMIN — MIRTAZAPINE SCH MG: 15 TABLET, FILM COATED ORAL at 08:47

## 2021-12-26 NOTE — IPNPDOC
Date Seen


The patient was seen on 12/26/21.





Progress Note


SUBJECTIVE: Patient seen examined at bedside.  He appears to be alert to person 

and place. He is much more active today, he tells me his sister's name 

correctly. per RN, neuro checks have been unremarkable.





OBJECTIVE


PHYSICAL EXAMINATION:


VITAL SIGNS: please see below


General: NAD, comfortable


HEENT: PERRLA, EOMI, sclerae clear


Neck: supple, normal ROM, no JVD


Respiratory: lungs CTAB, no wheeze, no rales, no crackles


CVS: RRR, normal S1, S2, no murmurs


Abdo: soft, no masses, no hepatosplenomegaly, BS+, no rebound tenderness


Extremities: no edema, pulses 2+


MSK: no joint deformities, normal ROM


Neuro: no focal neuro deficits, moving all 4 extremities, CN2-12 intact.  

Strength 4+ out of 5 on the left upper and lower extremity.  Hemiplegia on the 

right side.  Aphasia.  Right-sided facial droop.


Psych: calm, cooperative, AAO x 3





LABORATORY DATA, IMAGING STUDIES, MICROBIOLOGY: Please see below.





CT head wo contrast (12/24/21):


No change from most recent prior examination dated 12/23/2021.





DVT prophylaxis ordered?:  SCDs and teds





PROBLEMS:


1. Possible Seizure Activity


- I spoke to Dr. Millan. S/p 1000 mg IV keppra in ER. No recommendation for 

EEG. To c/w keppra 750 mg BID. N


- referral for outpatient neurology


- CT Head performed in the ED showing no acute changes


- c/w keppra 750mg bid


- Neurochecks q4 have been unremarkable.


- ST eval completed


- repeat CT head 12/24/21 is unchanged


- patient did have an isolated episode of rapid eye lid movement/eye movement, 

which stopped upon prompting patient.


- I d/w with Monty, it is possible that patient is having mild seizures vs 

this being a behavioural process vs subclinical seizures. He will likely require

a video EEG on an outpatient basis. At this time, neurology feels he is safe for

DC to [facility] and will be following up with him in clinic.





2. Severe aphasia


- 2/2 recent hemorrhagic cva


- ST eval, aspiration precautions





3. RA


- continue plaquenil, sulfasalazine, low dose prednisone





4. HTN


- reduce dose of amlodipine to 5 mg daily


- patient sister reports that he had an episode of hypotension upon 

repositioning


- perhaps this was due to orthostasis from sitting to supine


- we will check orthostats


- applied compression stocking





5. GERD


- continue protonix





6. Depression/anxiety


- continue sertraline, mirtazapine





7. Paraphimosis


- RN called to inform me that the gland penis is very swollen


- noted to have foreskin greatly swollen and retracted, I suspect it was 

replaced after josé catheter insertion


- Dr. Chester from urology came to bedside and was successful in restoring 

foreskin over glans penis





DVT prophylaxis


- mechanical





Dispo: patient is medically cleared. Due to Christmas holiday, transportation to

[facility] rehab is unable to be arranged due to staffing issues. We will 

await on call PFS on 12/27/21 for assistance. Patient transferred to [facility] status.





VS, I&O, 24H, [facility]


Vital Signs/I&O





Vital Signs








  Date Time  Temp Pulse Resp B/P (MAP) Pulse Ox O2 Delivery O2 Flow Rate FiO2


 


12/26/21 08:50    107/66    


 


12/26/21 06:00 97.5 85 18  93 Room Air  














I&O- Last 24 Hours up to 6 AM 


 


 12/26/21





 05:59


 


Intake Total 1054.5 ml


 


Output Total 1525 ml


 


Balance -470.5 ml











Laboratory Data


24H LABS


Laboratory Tests 2


12/26/21 06:19: 


Immature Granulocyte % (Auto) 1.9, Neutrophils (%) (Auto) 64.2, Lymphocytes (%) 

(Auto) 15.3L, Monocytes (%) (Auto) 13.7H, Eosinophils (%) (Auto) 3.8H, Basophils

(%) (Auto) 1.1H, Neutrophils # (Auto) 6.4, Lymphocytes # (Auto) 1.5, Monocytes #

(Auto) 1.4H, Eosinophils # (Auto) 0.4, Basophils # (Auto) 0.1, Nucleated Red 

Blood Cells % (auto) 0.0, Anion Gap 6L, Glomerular Filtration Rate > 60.0, 

Calcium Level 8.7L


CBC/BMP


Laboratory Tests


12/26/21 06:19








Microbiology





Microbiology


12/23/21 Respiratory Virus Panel (PCR) (RHEA) - Final, Complete


           


12/23/21 Blood Culture - Preliminary, Resulted


           No Growth after 48 hours. All Specime...


12/23/21 Blood Culture - Preliminary, Resulted


           No Growth after 48 hours. All Specime...











ABIGAIL CAMPOS MD       Dec 26, 2021 09:55

## 2021-12-27 VITALS — DIASTOLIC BLOOD PRESSURE: 76 MMHG | SYSTOLIC BLOOD PRESSURE: 116 MMHG

## 2021-12-27 VITALS — DIASTOLIC BLOOD PRESSURE: 57 MMHG | SYSTOLIC BLOOD PRESSURE: 106 MMHG

## 2021-12-27 LAB
BASOPHILS # BLD AUTO: 0.1 10^3/UL (ref 0–0.2)
BASOPHILS NFR BLD AUTO: 1.6 % (ref 0–1)
BUN SERPL-MCNC: 13 MG/DL (ref 7–18)
C DIFF TOX GENS STL QL NAA+PROBE: POSITIVE
CALCIUM SERPL-MCNC: 8.2 MG/DL (ref 8.8–10.2)
CHLORIDE SERPL-SCNC: 108 MEQ/L (ref 98–107)
CO2 SERPL-SCNC: 29 MEQ/L (ref 21–32)
CREAT SERPL-MCNC: 0.98 MG/DL (ref 0.7–1.3)
EOSINOPHIL # BLD AUTO: 0.4 10^3/UL (ref 0–0.5)
EOSINOPHIL NFR BLD AUTO: 4.7 % (ref 0–3)
GFR SERPL CREATININE-BSD FRML MDRD: > 60 ML/MIN/{1.73_M2} (ref 49–?)
GLUCOSE SERPL-MCNC: 82 MG/DL (ref 70–100)
HCT VFR BLD AUTO: 35.3 % (ref 42–52)
HGB BLD-MCNC: 11.1 G/DL (ref 13.5–17.5)
LYMPHOCYTES # BLD AUTO: 2.3 10^3/UL (ref 1.5–5)
LYMPHOCYTES NFR BLD AUTO: 27.4 % (ref 24–44)
MCH RBC QN AUTO: 31 PG (ref 27–33)
MCHC RBC AUTO-ENTMCNC: 31.4 G/DL (ref 32–36.5)
MCV RBC AUTO: 98.6 FL (ref 80–96)
MONOCYTES # BLD AUTO: 1.2 10^3/UL (ref 0–0.8)
MONOCYTES NFR BLD AUTO: 13.8 % (ref 2–8)
NEUTROPHILS # BLD AUTO: 4.2 10^3/UL (ref 1.5–8.5)
NEUTROPHILS NFR BLD AUTO: 49.5 % (ref 36–66)
PLATELET # BLD AUTO: 216 10^3/UL (ref 150–450)
POTASSIUM SERPL-SCNC: 4.1 MEQ/L (ref 3.5–5.1)
RBC # BLD AUTO: 3.58 10^6/UL (ref 4.3–6.1)
SODIUM SERPL-SCNC: 142 MEQ/L (ref 136–145)
WBC # BLD AUTO: 8.4 10^3/UL (ref 4–10)

## 2021-12-27 RX ADMIN — FIDAXOMICIN SCH MG: 200 TABLET, FILM COATED ORAL at 14:00

## 2021-12-27 RX ADMIN — PANTOPRAZOLE SODIUM SCH MG: 40 TABLET, DELAYED RELEASE ORAL at 09:53

## 2021-12-27 RX ADMIN — FIDAXOMICIN SCH MG: 200 TABLET, FILM COATED ORAL at 22:39

## 2021-12-27 RX ADMIN — MIRTAZAPINE SCH MG: 15 TABLET, FILM COATED ORAL at 09:54

## 2021-12-27 RX ADMIN — ASPIRIN 81 MG CHEWABLE TABLET SCH MG: 81 TABLET CHEWABLE at 09:53

## 2021-12-27 RX ADMIN — SENNOSIDES SCH TAB: 8.6 TABLET, FILM COATED ORAL at 09:00

## 2021-12-27 RX ADMIN — LEVETIRACETAM SCH MG: 250 TABLET, FILM COATED ORAL at 21:12

## 2021-12-27 RX ADMIN — HYDROXYCHLOROQUINE SULFATE SCH MG: 200 TABLET, FILM COATED ENTERAL at 09:54

## 2021-12-27 RX ADMIN — HYDROXYCHLOROQUINE SULFATE SCH MG: 200 TABLET, FILM COATED ENTERAL at 21:13

## 2021-12-27 RX ADMIN — ENOXAPARIN SODIUM SCH MG: 40 INJECTION SUBCUTANEOUS at 12:51

## 2021-12-27 RX ADMIN — LEVETIRACETAM SCH MG: 250 TABLET, FILM COATED ORAL at 09:53

## 2021-12-27 RX ADMIN — SERTRALINE HYDROCHLORIDE SCH MG: 25 TABLET ORAL at 09:54

## 2021-12-27 NOTE — IPNPDOC
Date Seen


The patient was seen on 12/27/21.





Progress Note


SUBJECTIVE: Patient seen examined at bedside.  He appears to be alert to person 

and place. He is much more active today, he tells me his sister's name 

correctly. per RN, neuro checks have been unremarkable. Had diarrhea this 

morning, foul smelling. Patient tells me he had 3 loose BMs yesterday as well. C

diff PCR +.





OBJECTIVE


PHYSICAL EXAMINATION:


VITAL SIGNS: please see below


General: NAD, comfortable


HEENT: PERRLA, EOMI, sclerae clear


Neck: supple, normal ROM, no JVD


Respiratory: lungs CTAB, no wheeze, no rales, no crackles


CVS: RRR, normal S1, S2, no murmurs


Abdo: soft, no masses, no hepatosplenomegaly, BS+, no rebound tenderness


Extremities: no edema, pulses 2+


MSK: no joint deformities, normal ROM


Neuro: no focal neuro deficits, moving all 4 extremities, CN2-12 intact.  

Strength 4+ out of 5 on the left upper and lower extremity.  Hemiplegia on the 

right side.  Aphasia.  Right-sided facial droop.


Psych: calm, cooperative, AAO x 3





LABORATORY DATA, IMAGING STUDIES, MICROBIOLOGY: Please see below.





DVT prophylaxis ordered?: TEDs. Lovenox





ASSESSMENT AND PLAN:his is a 67 y/o male with a pmh of hemorrhagic cva in 11/21,

rheumatoid arthritis, htn, gerd, depression/anxiety who presents to our ED from 

[facility] on 12/23 for evaluation of an episode of decreased consc

iousness and shaking that was observed by staff there.





PROBLEMS:


1. Possible Seizure Activity


- I spoke to Dr. Millan. S/p 1000 mg IV keppra in ER. No recommendation for 

EEG. To c/w keppra 750 mg BID. N


- referral for outpatient neurology


- CT Head performed in the ED showing no acute changes


- c/w keppra 750mg bid


- Neurochecks q4 have been unremarkable.


- ST spivey completed


- repeat CT head 12/24/21 is unchanged


- patient did have an isolated episode of rapid eye lid movement/eye movement, 

which stopped upon prompting patient.


- I d/w with Monty, it is possible that patient is having mild seizures vs 

this being a behavioural process vs subclinical seizures. He will likely require

a video EEG on an outpatient basis. At this time, neurology feels he is safe for

DC to [facility] and will be following up with him in clinic.





2. Severe aphasia


- 2/2 recent hemorrhagic cva


- ST eval, aspiration precautions





3. RA


- continue plaquenil, sulfasalazine, low dose prednisone





4. HTN


- reduce dose of amlodipine to 5 mg daily


- patient sister reports that he had an episode of hypotension upon 

repositioning


- perhaps this was due to orthostasis from sitting to supine


- we will check orthostats


- applied compression stocking





5. GERD


- continue protonix





6. Depression/anxiety


- continue sertraline, mirtazapine





7. Paraphimosis


- RN called to inform me that the gland penis is very swollen


- noted to have foreskin greatly swollen and retracted, I suspect it was 

replaced after josé catheter insertion


- Dr. Chester from urology came to bedside and was successful in restoring for

eskin over glans penis


- we will DC josé cath. Trial of void, PVR ordered. 





8. C diff positive


- first known occurence


- started on dificid 200 mg BID, due to lower reported rates of recurrence vs PO

vancomycin





Dispo: [facility] [place] [facility].





VS, I&O, 24H, Fishbone


Vital Signs/I&O





Vital Signs








  Date Time  Temp Pulse Resp B/P (MAP) Pulse Ox O2 Delivery O2 Flow Rate FiO2


 


12/27/21 09:00    94/60    


 


12/27/21 06:00 97.4 69 18  94 Room Air  














I&O- Last 24 Hours up to 6 AM 


 


 12/27/21





 06:00


 


Intake Total 1080 ml


 


Output Total 675 ml


 


Balance 405 ml











Laboratory Data


24H LABS


Laboratory Tests 2


12/27/21 05:45: 


Immature Granulocyte % (Auto) 3.0, Neutrophils (%) (Auto) 49.5, Lymphocytes (%) 

(Auto) 27.4, Monocytes (%) (Auto) 13.8H, Eosinophils (%) (Auto) 4.7H, Basophils 

(%) (Auto) 1.6H, Neutrophils # (Auto) 4.2, Lymphocytes # (Auto) 2.3, Monocytes #

(Auto) 1.2H, Eosinophils # (Auto) 0.4, Basophils # (Auto) 0.1, Nucleated Red 

Blood Cells % (auto) 0.0, Anion Gap 5L, Glomerular Filtration Rate > 60.0, 

Calcium Level 8.2L


12/27/21 08:53: 


CBC/BMP


Laboratory Tests


12/27/21 05:45








Microbiology





Microbiology


12/23/21 Respiratory Virus Panel (PCR) (RHEA) - Final, Complete


           


12/23/21 Blood Culture - Preliminary, Resulted


           No Growth after 72 hours. All specime...


12/23/21 Blood Culture - Preliminary, Resulted


           No Growth after 72 hours. All specime...











ABIGAIL CAMPOS MD       Dec 27, 2021 12:03

## 2021-12-28 VITALS — DIASTOLIC BLOOD PRESSURE: 80 MMHG | SYSTOLIC BLOOD PRESSURE: 142 MMHG

## 2021-12-28 VITALS — DIASTOLIC BLOOD PRESSURE: 63 MMHG | SYSTOLIC BLOOD PRESSURE: 124 MMHG

## 2021-12-28 LAB
BASOPHILS # BLD AUTO: 0.1 10^3/UL (ref 0–0.2)
BASOPHILS NFR BLD AUTO: 1.7 % (ref 0–1)
BUN SERPL-MCNC: 14 MG/DL (ref 7–18)
CALCIUM SERPL-MCNC: 8.6 MG/DL (ref 8.8–10.2)
CHLORIDE SERPL-SCNC: 108 MEQ/L (ref 98–107)
CO2 SERPL-SCNC: 28 MEQ/L (ref 21–32)
CREAT SERPL-MCNC: 0.93 MG/DL (ref 0.7–1.3)
EOSINOPHIL # BLD AUTO: 0.6 10^3/UL (ref 0–0.5)
EOSINOPHIL NFR BLD AUTO: 6.5 % (ref 0–3)
GFR SERPL CREATININE-BSD FRML MDRD: > 60 ML/MIN/{1.73_M2} (ref 49–?)
GLUCOSE SERPL-MCNC: 85 MG/DL (ref 70–100)
HCT VFR BLD AUTO: 35.3 % (ref 42–52)
HGB BLD-MCNC: 11.3 G/DL (ref 13.5–17.5)
LYMPHOCYTES # BLD AUTO: 2.3 10^3/UL (ref 1.5–5)
LYMPHOCYTES NFR BLD AUTO: 27.3 % (ref 24–44)
MCH RBC QN AUTO: 31.4 PG (ref 27–33)
MCHC RBC AUTO-ENTMCNC: 32 G/DL (ref 32–36.5)
MCV RBC AUTO: 98.1 FL (ref 80–96)
MONOCYTES # BLD AUTO: 1.1 10^3/UL (ref 0–0.8)
MONOCYTES NFR BLD AUTO: 12.5 % (ref 2–8)
NEUTROPHILS # BLD AUTO: 4 10^3/UL (ref 1.5–8.5)
NEUTROPHILS NFR BLD AUTO: 47.4 % (ref 36–66)
PLATELET # BLD AUTO: 232 10^3/UL (ref 150–450)
POTASSIUM SERPL-SCNC: 4.2 MEQ/L (ref 3.5–5.1)
RBC # BLD AUTO: 3.6 10^6/UL (ref 4.3–6.1)
SODIUM SERPL-SCNC: 140 MEQ/L (ref 136–145)
WBC # BLD AUTO: 8.4 10^3/UL (ref 4–10)

## 2021-12-28 RX ADMIN — ENOXAPARIN SODIUM SCH MG: 40 INJECTION SUBCUTANEOUS at 09:48

## 2021-12-28 RX ADMIN — LEVETIRACETAM SCH MG: 250 TABLET, FILM COATED ORAL at 09:45

## 2021-12-28 RX ADMIN — SERTRALINE HYDROCHLORIDE SCH MG: 25 TABLET ORAL at 09:45

## 2021-12-28 RX ADMIN — ASPIRIN 81 MG CHEWABLE TABLET SCH MG: 81 TABLET CHEWABLE at 09:45

## 2021-12-28 RX ADMIN — MIRTAZAPINE SCH MG: 15 TABLET, FILM COATED ORAL at 09:45

## 2021-12-28 RX ADMIN — HYDROXYCHLOROQUINE SULFATE SCH MG: 200 TABLET, FILM COATED ENTERAL at 09:45

## 2021-12-28 RX ADMIN — SENNOSIDES SCH TAB: 8.6 TABLET, FILM COATED ORAL at 09:00

## 2021-12-28 RX ADMIN — FIDAXOMICIN SCH MG: 200 TABLET, FILM COATED ORAL at 09:45

## 2021-12-28 RX ADMIN — PANTOPRAZOLE SODIUM SCH MG: 40 TABLET, DELAYED RELEASE ORAL at 09:47

## 2021-12-28 NOTE — IPNPDOC
Date Seen


The patient was seen on 12/28/21.





Progress Note


SUBJECTIVE: 


No acute events overnight.  Patient denies increased weakness, lightheadedness, 

fevers, chills, shortness of breath or chest pain.





PHYSICAL EXAMINATION:


VITAL SIGNS: please see below


General: NAD, comfortable


HEENT: PERRLA, EOMI, sclerae clear


Neck: supple, normal ROM, no JVD


Respiratory: lungs CTAB, no wheeze, no rales, no crackles


CVS: RRR, normal S1, S2, no murmurs


Abdo: soft, no masses, no hepatosplenomegaly, BS+, no rebound tenderness


Extremities: no edema, pulses 2+


MSK: no joint deformities, normal ROM


Neuro: no focal neuro deficits, moving all 4 extremities, CN2-12 intact.  

Strength 4+ out of 5 on the left upper and lower extremity.  Hemiplegia on the 

right side.  Aphasia.  Right-sided facial droop with numbness- not new .


Psych: calm, cooperative, AAO x 2-3





LABORATORY DATA: Please see below.





ASSESSMENT AND PLAN: 67 y/o male with a pmh of hemorrhagic cva in 11/21, 

rheumatoid arthritis, htn, gerd, depression/anxiety who presents to our ED from 

[facility] on 12/23 for evaluation of an episode of decreased 

consciousness and shaking that was observed by staff there.





1. Possible Seizure Activity


- No seizure activity overnight 


- Prior hospitalist spoke to Dr. Millan. S/p 1000 mg IV keppra in ER. No 

recommendation for EEG. To c/w keppra 750 mg BID. 


- referral for outpatient neurology


- CT Head performed in the ED showing no acute changes


- c/w keppra 750mg bid


- Neurochecks q4 have been unremarkable.


- ST eval completed


- repeat CT head 12/24/21 is unchanged


- patient did have an isolated episode of rapid eye lid movement/eye movement, 

which stopped upon prompting patient.


- I d/w with Monty, it is possible that patient is having mild seizures vs 

this being a behavioral process vs subclinical seizures. He will likely require 

a video EEG on an outpatient basis. At this time, neurology feels he is safe for

DC to [facility] and will be following up with him in clinic.





2. Severe aphasia


- 2/2 recent hemorrhagic cva


- ST eval, aspiration precautions





3. RA


- continue plaquenil, sulfasalazine, low dose prednisone





4. HTN


- reduce dose of amlodipine to 5 mg daily


- patient sister reports that he had an episode of hypotension upon 

repositioning


- perhaps this was due to orthostasis from sitting to supine


- we will check orthostats


- applied compression stocking





5. GERD


- continue protonix





6. Depression/anxiety


- continue sertraline, mirtazapine





7. Paraphimosis


- Improving 


- noted to have foreskin greatly swollen and retracted, I suspect it was 

replaced after josé catheter insertion


- Dr. Chester from urology came to bedside and was successful in restoring 

foreskin over glans penis


- we will DC josé cath. Trial of void, PVR successful 





DISPOSITION: Discharging today to [facility] for rehabilitation and 

nursing.





VS, I&O, 24H, [place]


Vital Signs/I&O





Vital Signs








  Date Time  Temp Pulse Resp B/P (MAP) Pulse Ox O2 Delivery O2 Flow Rate FiO2


 


12/28/21 09:48    142/80    


 


12/28/21 06:00 97.5 65 18  94 Room Air  














I&O- Last 24 Hours up to 6 AM 


 


 12/28/21





 06:00


 


Intake Total 1427 ml


 


Output Total 400 ml


 


Balance 1027 ml











Laboratory Data


24H LABS


Laboratory Tests 2


12/28/21 05:32: 


Immature Granulocyte % (Auto) 4.6H, Neutrophils (%) (Auto) 47.4, Lymphocytes (%)

(Auto) 27.3, Monocytes (%) (Auto) 12.5H, Eosinophils (%) (Auto) 6.5H, Basophils 

(%) (Auto) 1.7H, Neutrophils # (Auto) 4.0, Lymphocytes # (Auto) 2.3, Monocytes #

(Auto) 1.1H, Eosinophils # (Auto) 0.6H, Basophils # (Auto) 0.1, Nucleated Red 

Blood Cells % (auto) 0.0, Anion Gap 4L, Glomerular Filtration Rate > 60.0, 

Calcium Level 8.6L


CBC/BMP


Laboratory Tests


12/28/21 05:32








Microbiology





Microbiology


12/23/21 Respiratory Virus Panel (PCR) (RHEA) - Final, Complete


           


12/23/21 Blood Culture - Final, Complete


           NO GROWTH AFTER 5 DAYS


12/23/21 Blood Culture - Final, Complete


           NO GROWTH AFTER 5 DAYS











Cheri Frey MD            Dec 28, 2021 18:54

## 2022-01-04 ENCOUNTER — HOSPITAL ENCOUNTER (INPATIENT)
Dept: HOSPITAL 53 - M ED | Age: 67
LOS: 7 days | Discharge: NURSING, REHAB OR LONG-TERM CARE | DRG: 178 | End: 2022-01-11
Attending: STUDENT IN AN ORGANIZED HEALTH CARE EDUCATION/TRAINING PROGRAM | Admitting: STUDENT IN AN ORGANIZED HEALTH CARE EDUCATION/TRAINING PROGRAM
Payer: MEDICARE

## 2022-01-04 VITALS — WEIGHT: 176.37 LBS | BODY MASS INDEX: 26.73 KG/M2 | HEIGHT: 68 IN

## 2022-01-04 DIAGNOSIS — K21.9: ICD-10-CM

## 2022-01-04 DIAGNOSIS — B96.20: ICD-10-CM

## 2022-01-04 DIAGNOSIS — F32.A: ICD-10-CM

## 2022-01-04 DIAGNOSIS — F41.9: ICD-10-CM

## 2022-01-04 DIAGNOSIS — I69.291: ICD-10-CM

## 2022-01-04 DIAGNOSIS — Z20.822: ICD-10-CM

## 2022-01-04 DIAGNOSIS — I69.151: ICD-10-CM

## 2022-01-04 DIAGNOSIS — Z79.899: ICD-10-CM

## 2022-01-04 DIAGNOSIS — Z79.52: ICD-10-CM

## 2022-01-04 DIAGNOSIS — A04.72: ICD-10-CM

## 2022-01-04 DIAGNOSIS — N39.0: ICD-10-CM

## 2022-01-04 DIAGNOSIS — Z87.891: ICD-10-CM

## 2022-01-04 DIAGNOSIS — I10: ICD-10-CM

## 2022-01-04 DIAGNOSIS — Z88.0: ICD-10-CM

## 2022-01-04 DIAGNOSIS — Z79.2: ICD-10-CM

## 2022-01-04 DIAGNOSIS — Z79.82: ICD-10-CM

## 2022-01-04 DIAGNOSIS — J69.0: Primary | ICD-10-CM

## 2022-01-04 DIAGNOSIS — Z90.49: ICD-10-CM

## 2022-01-04 DIAGNOSIS — R56.9: ICD-10-CM

## 2022-01-04 DIAGNOSIS — M06.9: ICD-10-CM

## 2022-01-04 LAB
ALBUMIN SERPL BCG-MCNC: 2.6 GM/DL (ref 3.2–5.2)
ALT SERPL W P-5'-P-CCNC: 116 U/L (ref 12–78)
BASE EXCESS BLDA CALC-SCNC: -0.5 MMOL/L (ref -2–2)
BASE EXCESS BLDV CALC-SCNC: -1.2 MMOL/L (ref -2–2)
BILIRUB CONJ SERPL-MCNC: 0.3 MG/DL (ref 0–0.2)
BILIRUB SERPL-MCNC: 0.5 MG/DL (ref 0.2–1)
BUN SERPL-MCNC: 22 MG/DL (ref 7–18)
CALCIUM SERPL-MCNC: 8.5 MG/DL (ref 8.8–10.2)
CHLORIDE SERPL-SCNC: 100 MEQ/L (ref 98–107)
CO2 BLDA CALC-SCNC: 23.6 MEQ/L (ref 23–31)
CO2 BLDV CALC-SCNC: 27.1 MEQ/L (ref 24–28)
CO2 SERPL-SCNC: 29 MEQ/L (ref 21–32)
CREAT SERPL-MCNC: 0.93 MG/DL (ref 0.7–1.3)
EOSINOPHIL NFR BLD MANUAL: 5 % (ref 0–3)
GFR SERPL CREATININE-BSD FRML MDRD: > 60 ML/MIN/{1.73_M2} (ref 49–?)
GLUCOSE SERPL-MCNC: 114 MG/DL (ref 70–100)
HCO3 BLDA-SCNC: 22.6 MEQ/L (ref 22–26)
HCO3 BLDV-SCNC: 25.5 MEQ/L (ref 23–27)
HCO3 STD BLDA-SCNC: 24 MEQ/L (ref 22–26)
HCO3 STD BLDV-SCNC: 22.4 MEQ/L
HCT VFR BLD AUTO: 37.9 % (ref 42–52)
HGB BLD-MCNC: 12.1 G/DL (ref 13.5–17.5)
LYMPHOCYTES NFR BLD MANUAL: 2 % (ref 16–44)
MCH RBC QN AUTO: 30.9 PG (ref 27–33)
MCHC RBC AUTO-ENTMCNC: 31.9 G/DL (ref 32–36.5)
MCV RBC AUTO: 96.7 FL (ref 80–96)
METAMYELOCYTES NFR BLD MANUAL: 2 % (ref 0–0)
MONOCYTES NFR BLD MANUAL: 10 % (ref 0–5)
MYELOBLASTS NFR BLD MANUAL: 2 % (ref 0–0)
NEUTROPHILS NFR BLD MANUAL: 73 % (ref 28–66)
OSMOLALITY SERPL: 290 MOSM/KG (ref 280–301)
PCO2 BLDA: 32.2 MMHG (ref 35–45)
PCO2 BLDV: 51 MMHG (ref 38–50)
PH BLDA: 7.46 UNITS (ref 7.35–7.45)
PH BLDV: 7.32 UNITS (ref 7.33–7.43)
PLATELET # BLD AUTO: 349 10^3/UL (ref 150–450)
PLATELET BLD QL SMEAR: NORMAL
PO2 BLDA: 90 MMHG (ref 75–100)
PO2 BLDV: 27.7 MMHG (ref 30–50)
POTASSIUM SERPL-SCNC: 4.4 MEQ/L (ref 3.5–5.1)
PROT SERPL-MCNC: 6.7 GM/DL (ref 6.4–8.2)
RBC # BLD AUTO: 3.92 10^6/UL (ref 4.3–6.1)
RSV RNA NPH QL NAA+PROBE: NEGATIVE
SAO2 % BLDA: 97 % (ref 95–99)
SAO2 % BLDV: 45.8 % (ref 60–80)
SODIUM SERPL-SCNC: 134 MEQ/L (ref 136–145)
TSH SERPL DL<=0.005 MIU/L-ACNC: 1 UIU/ML (ref 0.36–3.74)
VARIANT LYMPHS NFR BLD MANUAL: 2 % (ref 0–5)
WBC # BLD AUTO: 13 10^3/UL (ref 4–10)

## 2022-01-04 RX ADMIN — SODIUM CHLORIDE SCH MLS/HR: 9 INJECTION, SOLUTION INTRAVENOUS at 19:49

## 2022-01-05 VITALS — DIASTOLIC BLOOD PRESSURE: 65 MMHG | SYSTOLIC BLOOD PRESSURE: 124 MMHG

## 2022-01-05 VITALS — DIASTOLIC BLOOD PRESSURE: 75 MMHG | SYSTOLIC BLOOD PRESSURE: 117 MMHG

## 2022-01-05 VITALS — DIASTOLIC BLOOD PRESSURE: 65 MMHG | SYSTOLIC BLOOD PRESSURE: 121 MMHG

## 2022-01-05 LAB
BUN SERPL-MCNC: 21 MG/DL (ref 7–18)
CALCIUM SERPL-MCNC: 7.9 MG/DL (ref 8.8–10.2)
CHLORIDE SERPL-SCNC: 106 MEQ/L (ref 98–107)
CO2 SERPL-SCNC: 25 MEQ/L (ref 21–32)
CREAT SERPL-MCNC: 0.77 MG/DL (ref 0.7–1.3)
GFR SERPL CREATININE-BSD FRML MDRD: > 60 ML/MIN/{1.73_M2} (ref 49–?)
GLUCOSE SERPL-MCNC: 96 MG/DL (ref 70–100)
HCT VFR BLD AUTO: 32.3 % (ref 42–52)
HGB BLD-MCNC: 10.3 G/DL (ref 13.5–17.5)
MCH RBC QN AUTO: 30.8 PG (ref 27–33)
MCHC RBC AUTO-ENTMCNC: 31.9 G/DL (ref 32–36.5)
MCV RBC AUTO: 96.7 FL (ref 80–96)
PLATELET # BLD AUTO: 293 10^3/UL (ref 150–450)
POTASSIUM SERPL-SCNC: 4.1 MEQ/L (ref 3.5–5.1)
RBC # BLD AUTO: 3.34 10^6/UL (ref 4.3–6.1)
SODIUM SERPL-SCNC: 137 MEQ/L (ref 136–145)
WBC # BLD AUTO: 10.3 10^3/UL (ref 4–10)

## 2022-01-05 RX ADMIN — MIRTAZAPINE SCH MG: 15 TABLET, FILM COATED ORAL at 00:03

## 2022-01-05 RX ADMIN — MEROPENEM AND SODIUM CHLORIDE SCH MLS/HR: 1 INJECTION, SOLUTION INTRAVENOUS at 12:00

## 2022-01-05 RX ADMIN — LEVETIRACETAM SCH MG: 250 TABLET, FILM COATED ORAL at 09:23

## 2022-01-05 RX ADMIN — MEROPENEM AND SODIUM CHLORIDE SCH MLS/HR: 1 INJECTION, SOLUTION INTRAVENOUS at 00:03

## 2022-01-05 RX ADMIN — FIDAXOMICIN SCH MG: 200 TABLET, FILM COATED ORAL at 04:02

## 2022-01-05 RX ADMIN — SERTRALINE HYDROCHLORIDE SCH MG: 25 TABLET ORAL at 09:24

## 2022-01-05 RX ADMIN — PROBIOTIC PRODUCT - TAB SCH EA: TAB at 00:03

## 2022-01-05 RX ADMIN — MEROPENEM AND SODIUM CHLORIDE SCH MLS/HR: 1 INJECTION, SOLUTION INTRAVENOUS at 03:54

## 2022-01-05 RX ADMIN — FIDAXOMICIN SCH MG: 200 TABLET, FILM COATED ORAL at 09:24

## 2022-01-05 RX ADMIN — PROBIOTIC PRODUCT - TAB SCH EA: TAB at 09:23

## 2022-01-05 RX ADMIN — SODIUM CHLORIDE SCH MLS/HR: 9 INJECTION, SOLUTION INTRAVENOUS at 20:05

## 2022-01-05 RX ADMIN — MIRTAZAPINE SCH MG: 15 TABLET, FILM COATED ORAL at 23:02

## 2022-01-05 RX ADMIN — THIAMINE HYDROCHLORIDE SCH MG: 100 INJECTION, SOLUTION INTRAMUSCULAR; INTRAVENOUS at 00:03

## 2022-01-05 RX ADMIN — PROBIOTIC PRODUCT - TAB SCH EA: TAB at 18:47

## 2022-01-05 RX ADMIN — MEROPENEM AND SODIUM CHLORIDE SCH MLS/HR: 1 INJECTION, SOLUTION INTRAVENOUS at 20:06

## 2022-01-05 RX ADMIN — LEVETIRACETAM SCH MG: 250 TABLET, FILM COATED ORAL at 00:03

## 2022-01-05 RX ADMIN — FIDAXOMICIN SCH MG: 200 TABLET, FILM COATED ORAL at 23:02

## 2022-01-05 RX ADMIN — SODIUM CHLORIDE SCH MLS/HR: 9 INJECTION, SOLUTION INTRAVENOUS at 04:02

## 2022-01-05 RX ADMIN — SODIUM CHLORIDE SCH MLS/HR: 9 INJECTION, SOLUTION INTRAVENOUS at 16:01

## 2022-01-05 RX ADMIN — LEVETIRACETAM SCH MG: 250 TABLET, FILM COATED ORAL at 23:02

## 2022-01-05 RX ADMIN — PANTOPRAZOLE SODIUM SCH MG: 40 TABLET, DELAYED RELEASE ORAL at 09:24

## 2022-01-05 RX ADMIN — THIAMINE HYDROCHLORIDE SCH MG: 100 INJECTION, SOLUTION INTRAMUSCULAR; INTRAVENOUS at 09:24

## 2022-01-06 VITALS — SYSTOLIC BLOOD PRESSURE: 121 MMHG | DIASTOLIC BLOOD PRESSURE: 68 MMHG

## 2022-01-06 VITALS — DIASTOLIC BLOOD PRESSURE: 66 MMHG | SYSTOLIC BLOOD PRESSURE: 123 MMHG

## 2022-01-06 VITALS — DIASTOLIC BLOOD PRESSURE: 69 MMHG | SYSTOLIC BLOOD PRESSURE: 125 MMHG

## 2022-01-06 LAB
BUN SERPL-MCNC: 16 MG/DL (ref 7–18)
CALCIUM SERPL-MCNC: 7.9 MG/DL (ref 8.8–10.2)
CHLORIDE SERPL-SCNC: 107 MEQ/L (ref 98–107)
CO2 SERPL-SCNC: 25 MEQ/L (ref 21–32)
CREAT SERPL-MCNC: 0.77 MG/DL (ref 0.7–1.3)
GFR SERPL CREATININE-BSD FRML MDRD: > 60 ML/MIN/{1.73_M2} (ref 49–?)
GLUCOSE SERPL-MCNC: 91 MG/DL (ref 70–100)
HCT VFR BLD AUTO: 31.5 % (ref 42–52)
HGB BLD-MCNC: 10 G/DL (ref 13.5–17.5)
MCH RBC QN AUTO: 30.8 PG (ref 27–33)
MCHC RBC AUTO-ENTMCNC: 31.7 G/DL (ref 32–36.5)
MCV RBC AUTO: 96.9 FL (ref 80–96)
PLATELET # BLD AUTO: 309 10^3/UL (ref 150–450)
POTASSIUM SERPL-SCNC: 3.9 MEQ/L (ref 3.5–5.1)
RBC # BLD AUTO: 3.25 10^6/UL (ref 4.3–6.1)
SODIUM SERPL-SCNC: 136 MEQ/L (ref 136–145)
WBC # BLD AUTO: 10.6 10^3/UL (ref 4–10)

## 2022-01-06 RX ADMIN — MIRTAZAPINE SCH MG: 15 TABLET, FILM COATED ORAL at 20:12

## 2022-01-06 RX ADMIN — THIAMINE HYDROCHLORIDE SCH MG: 100 INJECTION, SOLUTION INTRAMUSCULAR; INTRAVENOUS at 09:59

## 2022-01-06 RX ADMIN — SODIUM CHLORIDE SCH MLS/HR: 9 INJECTION, SOLUTION INTRAVENOUS at 14:53

## 2022-01-06 RX ADMIN — PROBIOTIC PRODUCT - TAB SCH EA: TAB at 09:58

## 2022-01-06 RX ADMIN — SODIUM CHLORIDE SCH MLS/HR: 9 INJECTION, SOLUTION INTRAVENOUS at 04:07

## 2022-01-06 RX ADMIN — MEROPENEM AND SODIUM CHLORIDE SCH MLS/HR: 1 INJECTION, SOLUTION INTRAVENOUS at 20:11

## 2022-01-06 RX ADMIN — LEVETIRACETAM SCH MG: 250 TABLET, FILM COATED ORAL at 20:11

## 2022-01-06 RX ADMIN — MEROPENEM AND SODIUM CHLORIDE SCH MLS/HR: 1 INJECTION, SOLUTION INTRAVENOUS at 04:07

## 2022-01-06 RX ADMIN — PANTOPRAZOLE SODIUM SCH MG: 40 TABLET, DELAYED RELEASE ORAL at 09:58

## 2022-01-06 RX ADMIN — PROBIOTIC PRODUCT - TAB SCH EA: TAB at 18:15

## 2022-01-06 RX ADMIN — LEVETIRACETAM SCH MG: 250 TABLET, FILM COATED ORAL at 09:58

## 2022-01-06 RX ADMIN — SERTRALINE HYDROCHLORIDE SCH MG: 25 TABLET ORAL at 09:59

## 2022-01-06 RX ADMIN — FIDAXOMICIN SCH MG: 200 TABLET, FILM COATED ORAL at 09:58

## 2022-01-06 RX ADMIN — MEROPENEM AND SODIUM CHLORIDE SCH MLS/HR: 1 INJECTION, SOLUTION INTRAVENOUS at 13:06

## 2022-01-06 RX ADMIN — FIDAXOMICIN SCH MG: 200 TABLET, FILM COATED ORAL at 20:11

## 2022-01-07 VITALS — SYSTOLIC BLOOD PRESSURE: 121 MMHG | DIASTOLIC BLOOD PRESSURE: 67 MMHG

## 2022-01-07 VITALS — SYSTOLIC BLOOD PRESSURE: 121 MMHG | DIASTOLIC BLOOD PRESSURE: 68 MMHG

## 2022-01-07 VITALS — DIASTOLIC BLOOD PRESSURE: 65 MMHG | SYSTOLIC BLOOD PRESSURE: 117 MMHG

## 2022-01-07 LAB
BACTERIA ID TEST ISLT QL CULT: (no result)
BACTERIA SPEC BFLD CULT: (no result)
BUN SERPL-MCNC: 12 MG/DL (ref 7–18)
CALCIUM SERPL-MCNC: 7.7 MG/DL (ref 8.8–10.2)
CHLORIDE SERPL-SCNC: 108 MEQ/L (ref 98–107)
CO2 SERPL-SCNC: 25 MEQ/L (ref 21–32)
CREAT SERPL-MCNC: 0.68 MG/DL (ref 0.7–1.3)
GFR SERPL CREATININE-BSD FRML MDRD: > 60 ML/MIN/{1.73_M2} (ref 49–?)
GLUCOSE SERPL-MCNC: 88 MG/DL (ref 70–100)
HCT VFR BLD AUTO: 29.4 % (ref 42–52)
HGB BLD-MCNC: 9.4 G/DL (ref 13.5–17.5)
MCH RBC QN AUTO: 30.5 PG (ref 27–33)
MCHC RBC AUTO-ENTMCNC: 32 G/DL (ref 32–36.5)
MCV RBC AUTO: 95.5 FL (ref 80–96)
PLATELET # BLD AUTO: 322 10^3/UL (ref 150–450)
POTASSIUM SERPL-SCNC: 3.8 MEQ/L (ref 3.5–5.1)
RBC # BLD AUTO: 3.08 10^6/UL (ref 4.3–6.1)
SODIUM SERPL-SCNC: 138 MEQ/L (ref 136–145)
WBC # BLD AUTO: 12.4 10^3/UL (ref 4–10)

## 2022-01-07 RX ADMIN — FIDAXOMICIN SCH MG: 200 TABLET, FILM COATED ORAL at 09:50

## 2022-01-07 RX ADMIN — PROBIOTIC PRODUCT - TAB SCH EA: TAB at 09:48

## 2022-01-07 RX ADMIN — LEVETIRACETAM SCH MG: 250 TABLET, FILM COATED ORAL at 20:50

## 2022-01-07 RX ADMIN — PROBIOTIC PRODUCT - TAB SCH EA: TAB at 17:05

## 2022-01-07 RX ADMIN — METRONIDAZOLE SCH MG: 500 TABLET ORAL at 17:04

## 2022-01-07 RX ADMIN — MEROPENEM AND SODIUM CHLORIDE SCH MLS/HR: 1 INJECTION, SOLUTION INTRAVENOUS at 04:09

## 2022-01-07 RX ADMIN — LEVETIRACETAM SCH MG: 250 TABLET, FILM COATED ORAL at 09:49

## 2022-01-07 RX ADMIN — SERTRALINE HYDROCHLORIDE SCH MG: 25 TABLET ORAL at 09:48

## 2022-01-07 RX ADMIN — FIDAXOMICIN SCH MG: 200 TABLET, FILM COATED ORAL at 20:47

## 2022-01-07 RX ADMIN — THIAMINE HYDROCHLORIDE SCH MG: 100 INJECTION, SOLUTION INTRAMUSCULAR; INTRAVENOUS at 09:50

## 2022-01-07 RX ADMIN — SODIUM CHLORIDE SCH MLS/HR: 9 INJECTION, SOLUTION INTRAVENOUS at 01:35

## 2022-01-07 RX ADMIN — PANTOPRAZOLE SODIUM SCH MG: 40 TABLET, DELAYED RELEASE ORAL at 09:50

## 2022-01-07 RX ADMIN — MIRTAZAPINE SCH MG: 15 TABLET, FILM COATED ORAL at 20:50

## 2022-01-07 RX ADMIN — METRONIDAZOLE SCH MG: 500 TABLET ORAL at 20:50

## 2022-01-08 VITALS — DIASTOLIC BLOOD PRESSURE: 73 MMHG | SYSTOLIC BLOOD PRESSURE: 121 MMHG

## 2022-01-08 VITALS — DIASTOLIC BLOOD PRESSURE: 62 MMHG | SYSTOLIC BLOOD PRESSURE: 112 MMHG

## 2022-01-08 VITALS — DIASTOLIC BLOOD PRESSURE: 79 MMHG | SYSTOLIC BLOOD PRESSURE: 140 MMHG

## 2022-01-08 LAB
BUN SERPL-MCNC: 11 MG/DL (ref 7–18)
CALCIUM SERPL-MCNC: 7.8 MG/DL (ref 8.8–10.2)
CHLORIDE SERPL-SCNC: 107 MEQ/L (ref 98–107)
CO2 SERPL-SCNC: 26 MEQ/L (ref 21–32)
CREAT SERPL-MCNC: 0.61 MG/DL (ref 0.7–1.3)
GFR SERPL CREATININE-BSD FRML MDRD: > 60 ML/MIN/{1.73_M2} (ref 49–?)
GLUCOSE SERPL-MCNC: 93 MG/DL (ref 70–100)
HCT VFR BLD AUTO: 30.2 % (ref 42–52)
HGB BLD-MCNC: 9.7 G/DL (ref 13.5–17.5)
MCH RBC QN AUTO: 30.7 PG (ref 27–33)
MCHC RBC AUTO-ENTMCNC: 32.1 G/DL (ref 32–36.5)
MCV RBC AUTO: 95.6 FL (ref 80–96)
PLATELET # BLD AUTO: 326 10^3/UL (ref 150–450)
POTASSIUM SERPL-SCNC: 4.1 MEQ/L (ref 3.5–5.1)
RBC # BLD AUTO: 3.16 10^6/UL (ref 4.3–6.1)
SODIUM SERPL-SCNC: 139 MEQ/L (ref 136–145)
WBC # BLD AUTO: 11.5 10^3/UL (ref 4–10)

## 2022-01-08 RX ADMIN — PROBIOTIC PRODUCT - TAB SCH EA: TAB at 09:54

## 2022-01-08 RX ADMIN — THIAMINE HYDROCHLORIDE SCH MG: 100 INJECTION, SOLUTION INTRAMUSCULAR; INTRAVENOUS at 09:54

## 2022-01-08 RX ADMIN — LEVETIRACETAM SCH MG: 250 TABLET, FILM COATED ORAL at 20:24

## 2022-01-08 RX ADMIN — PANTOPRAZOLE SODIUM SCH MG: 40 TABLET, DELAYED RELEASE ORAL at 09:50

## 2022-01-08 RX ADMIN — LEVETIRACETAM SCH MG: 250 TABLET, FILM COATED ORAL at 09:55

## 2022-01-08 RX ADMIN — METRONIDAZOLE SCH MG: 500 TABLET ORAL at 17:19

## 2022-01-08 RX ADMIN — METRONIDAZOLE SCH MG: 500 TABLET ORAL at 20:23

## 2022-01-08 RX ADMIN — FIDAXOMICIN SCH MG: 200 TABLET, FILM COATED ORAL at 20:24

## 2022-01-08 RX ADMIN — MIRTAZAPINE SCH MG: 15 TABLET, FILM COATED ORAL at 20:23

## 2022-01-08 RX ADMIN — OMEPRAZOLE SCH MG: 20 CAPSULE, DELAYED RELEASE ORAL at 11:36

## 2022-01-08 RX ADMIN — SERTRALINE HYDROCHLORIDE SCH MG: 25 TABLET ORAL at 09:54

## 2022-01-08 RX ADMIN — PROBIOTIC PRODUCT - TAB SCH EA: TAB at 17:20

## 2022-01-08 RX ADMIN — METRONIDAZOLE SCH MG: 500 TABLET ORAL at 09:55

## 2022-01-08 RX ADMIN — FIDAXOMICIN SCH MG: 200 TABLET, FILM COATED ORAL at 09:54

## 2022-01-09 VITALS — SYSTOLIC BLOOD PRESSURE: 114 MMHG | DIASTOLIC BLOOD PRESSURE: 71 MMHG

## 2022-01-09 VITALS — DIASTOLIC BLOOD PRESSURE: 70 MMHG | SYSTOLIC BLOOD PRESSURE: 114 MMHG

## 2022-01-09 VITALS — SYSTOLIC BLOOD PRESSURE: 116 MMHG | DIASTOLIC BLOOD PRESSURE: 70 MMHG

## 2022-01-09 VITALS — SYSTOLIC BLOOD PRESSURE: 112 MMHG | DIASTOLIC BLOOD PRESSURE: 70 MMHG

## 2022-01-09 LAB
BUN SERPL-MCNC: 11 MG/DL (ref 7–18)
CALCIUM SERPL-MCNC: 8.3 MG/DL (ref 8.8–10.2)
CHLORIDE SERPL-SCNC: 106 MEQ/L (ref 98–107)
CO2 SERPL-SCNC: 26 MEQ/L (ref 21–32)
CREAT SERPL-MCNC: 0.71 MG/DL (ref 0.7–1.3)
GFR SERPL CREATININE-BSD FRML MDRD: > 60 ML/MIN/{1.73_M2} (ref 49–?)
GLUCOSE SERPL-MCNC: 96 MG/DL (ref 70–100)
HCT VFR BLD AUTO: 31 % (ref 42–52)
HGB BLD-MCNC: 10 G/DL (ref 13.5–17.5)
MCH RBC QN AUTO: 30.6 PG (ref 27–33)
MCHC RBC AUTO-ENTMCNC: 32.3 G/DL (ref 32–36.5)
MCV RBC AUTO: 94.8 FL (ref 80–96)
PLATELET # BLD AUTO: 412 10^3/UL (ref 150–450)
POTASSIUM SERPL-SCNC: 3.6 MEQ/L (ref 3.5–5.1)
RBC # BLD AUTO: 3.27 10^6/UL (ref 4.3–6.1)
SODIUM SERPL-SCNC: 139 MEQ/L (ref 136–145)
WBC # BLD AUTO: 14 10^3/UL (ref 4–10)

## 2022-01-09 RX ADMIN — METRONIDAZOLE SCH MG: 500 TABLET ORAL at 16:00

## 2022-01-09 RX ADMIN — METRONIDAZOLE SCH MG: 500 TABLET ORAL at 20:44

## 2022-01-09 RX ADMIN — Medication SCH MG: at 09:36

## 2022-01-09 RX ADMIN — METRONIDAZOLE SCH MG: 500 TABLET ORAL at 09:35

## 2022-01-09 RX ADMIN — SERTRALINE HYDROCHLORIDE SCH MG: 25 TABLET ORAL at 09:35

## 2022-01-09 RX ADMIN — PROBIOTIC PRODUCT - TAB SCH EA: TAB at 09:36

## 2022-01-09 RX ADMIN — FIDAXOMICIN SCH MG: 200 TABLET, FILM COATED ORAL at 09:36

## 2022-01-09 RX ADMIN — LEVETIRACETAM SCH MG: 250 TABLET, FILM COATED ORAL at 20:44

## 2022-01-09 RX ADMIN — LEVETIRACETAM SCH MG: 250 TABLET, FILM COATED ORAL at 09:35

## 2022-01-09 RX ADMIN — PROBIOTIC PRODUCT - TAB SCH EA: TAB at 17:42

## 2022-01-09 RX ADMIN — MIRTAZAPINE SCH MG: 15 TABLET, FILM COATED ORAL at 20:44

## 2022-01-09 RX ADMIN — FIDAXOMICIN SCH MG: 200 TABLET, FILM COATED ORAL at 20:44

## 2022-01-09 RX ADMIN — OMEPRAZOLE SCH MG: 20 CAPSULE, DELAYED RELEASE ORAL at 09:36

## 2022-01-10 VITALS — DIASTOLIC BLOOD PRESSURE: 72 MMHG | SYSTOLIC BLOOD PRESSURE: 111 MMHG

## 2022-01-10 VITALS — SYSTOLIC BLOOD PRESSURE: 111 MMHG | DIASTOLIC BLOOD PRESSURE: 70 MMHG

## 2022-01-10 VITALS — SYSTOLIC BLOOD PRESSURE: 114 MMHG | DIASTOLIC BLOOD PRESSURE: 69 MMHG

## 2022-01-10 VITALS — DIASTOLIC BLOOD PRESSURE: 70 MMHG | SYSTOLIC BLOOD PRESSURE: 111 MMHG

## 2022-01-10 LAB
BUN SERPL-MCNC: 12 MG/DL (ref 7–18)
CALCIUM SERPL-MCNC: 8 MG/DL (ref 8.8–10.2)
CHLORIDE SERPL-SCNC: 105 MEQ/L (ref 98–107)
CO2 SERPL-SCNC: 28 MEQ/L (ref 21–32)
CREAT SERPL-MCNC: 0.79 MG/DL (ref 0.7–1.3)
EOSINOPHIL NFR BLD MANUAL: 7 % (ref 0–3)
GFR SERPL CREATININE-BSD FRML MDRD: > 60 ML/MIN/{1.73_M2} (ref 49–?)
GLUCOSE SERPL-MCNC: 136 MG/DL (ref 70–100)
HCT VFR BLD AUTO: 34 % (ref 42–52)
HGB BLD-MCNC: 11 G/DL (ref 13.5–17.5)
LYMPHOCYTES NFR BLD MANUAL: 7 % (ref 16–44)
MAGNESIUM SERPL-MCNC: 1.9 MG/DL (ref 1.8–2.4)
MCH RBC QN AUTO: 30.8 PG (ref 27–33)
MCHC RBC AUTO-ENTMCNC: 32.4 G/DL (ref 32–36.5)
MCV RBC AUTO: 95.2 FL (ref 80–96)
METAMYELOCYTES NFR BLD MANUAL: 2 % (ref 0–0)
MONOCYTES NFR BLD MANUAL: 2 % (ref 0–5)
MYELOBLASTS NFR BLD MANUAL: 4 % (ref 0–0)
NEUTROPHILS NFR BLD MANUAL: 75 % (ref 28–66)
PLATELET # BLD AUTO: 456 10^3/UL (ref 150–450)
PLATELET BLD QL SMEAR: NORMAL
POTASSIUM SERPL-SCNC: 4.5 MEQ/L (ref 3.5–5.1)
RBC # BLD AUTO: 3.57 10^6/UL (ref 4.3–6.1)
RBC MORPH BLD: NORMAL
SODIUM SERPL-SCNC: 139 MEQ/L (ref 136–145)
WBC # BLD AUTO: 14.3 10^3/UL (ref 4–10)

## 2022-01-10 RX ADMIN — LEVETIRACETAM SCH MG: 250 TABLET, FILM COATED ORAL at 10:11

## 2022-01-10 RX ADMIN — PROBIOTIC PRODUCT - TAB SCH EA: TAB at 08:00

## 2022-01-10 RX ADMIN — Medication SCH MG: at 10:11

## 2022-01-10 RX ADMIN — METRONIDAZOLE SCH MG: 500 TABLET ORAL at 21:01

## 2022-01-10 RX ADMIN — METRONIDAZOLE SCH MG: 500 TABLET ORAL at 10:10

## 2022-01-10 RX ADMIN — MIRTAZAPINE SCH MG: 15 TABLET, FILM COATED ORAL at 21:01

## 2022-01-10 RX ADMIN — METRONIDAZOLE SCH MG: 500 TABLET ORAL at 17:15

## 2022-01-10 RX ADMIN — FIDAXOMICIN SCH MG: 200 TABLET, FILM COATED ORAL at 10:10

## 2022-01-10 RX ADMIN — PROBIOTIC PRODUCT - TAB SCH EA: TAB at 17:15

## 2022-01-10 RX ADMIN — PROBIOTIC PRODUCT - TAB SCH EA: TAB at 10:15

## 2022-01-10 RX ADMIN — LEVETIRACETAM SCH MG: 250 TABLET, FILM COATED ORAL at 21:01

## 2022-01-10 RX ADMIN — SERTRALINE HYDROCHLORIDE SCH MG: 25 TABLET ORAL at 10:10

## 2022-01-10 RX ADMIN — OMEPRAZOLE SCH MG: 20 CAPSULE, DELAYED RELEASE ORAL at 10:10

## 2022-01-10 RX ADMIN — FIDAXOMICIN SCH MG: 200 TABLET, FILM COATED ORAL at 21:01

## 2022-01-11 VITALS — SYSTOLIC BLOOD PRESSURE: 121 MMHG | DIASTOLIC BLOOD PRESSURE: 77 MMHG

## 2022-01-11 RX ADMIN — FIDAXOMICIN SCH MG: 200 TABLET, FILM COATED ORAL at 08:17

## 2022-01-11 RX ADMIN — PROBIOTIC PRODUCT - TAB SCH EA: TAB at 08:17

## 2022-01-11 RX ADMIN — Medication SCH MG: at 08:18

## 2022-01-11 RX ADMIN — METRONIDAZOLE SCH MG: 500 TABLET ORAL at 08:17

## 2022-01-11 RX ADMIN — LEVETIRACETAM SCH MG: 250 TABLET, FILM COATED ORAL at 08:18

## 2022-01-11 RX ADMIN — SERTRALINE HYDROCHLORIDE SCH MG: 25 TABLET ORAL at 08:18

## 2022-01-11 RX ADMIN — OMEPRAZOLE SCH MG: 20 CAPSULE, DELAYED RELEASE ORAL at 08:17
